# Patient Record
Sex: FEMALE | Race: WHITE | Employment: FULL TIME | URBAN - METROPOLITAN AREA
[De-identification: names, ages, dates, MRNs, and addresses within clinical notes are randomized per-mention and may not be internally consistent; named-entity substitution may affect disease eponyms.]

---

## 2017-01-31 ENCOUNTER — OFFICE VISIT (OUTPATIENT)
Dept: INTERNAL MEDICINE CLINIC | Age: 27
End: 2017-01-31

## 2017-01-31 VITALS
HEART RATE: 82 BPM | WEIGHT: 156 LBS | SYSTOLIC BLOOD PRESSURE: 126 MMHG | BODY MASS INDEX: 26.63 KG/M2 | HEIGHT: 64 IN | DIASTOLIC BLOOD PRESSURE: 89 MMHG | RESPIRATION RATE: 14 BRPM

## 2017-01-31 DIAGNOSIS — R10.13 EPIGASTRIC PAIN: Primary | ICD-10-CM

## 2017-01-31 RX ORDER — OMEPRAZOLE 40 MG/1
40 CAPSULE, DELAYED RELEASE ORAL DAILY
Qty: 30 CAP | Refills: 0 | Status: SHIPPED | OUTPATIENT
Start: 2017-01-31 | End: 2020-09-09

## 2017-02-01 LAB
ALBUMIN SERPL-MCNC: 4.4 G/DL (ref 3.5–5.5)
ALBUMIN/GLOB SERPL: 1.5 {RATIO} (ref 1.1–2.5)
ALP SERPL-CCNC: 39 IU/L (ref 39–117)
ALT SERPL-CCNC: 12 IU/L (ref 0–32)
AMYLASE SERPL-CCNC: 41 U/L (ref 31–124)
AST SERPL-CCNC: 15 IU/L (ref 0–40)
BILIRUB SERPL-MCNC: 0.4 MG/DL (ref 0–1.2)
BUN SERPL-MCNC: 9 MG/DL (ref 6–20)
BUN/CREAT SERPL: 10 (ref 8–20)
CALCIUM SERPL-MCNC: 9.5 MG/DL (ref 8.7–10.2)
CHLORIDE SERPL-SCNC: 99 MMOL/L (ref 96–106)
CO2 SERPL-SCNC: 22 MMOL/L (ref 18–29)
CREAT SERPL-MCNC: 0.89 MG/DL (ref 0.57–1)
ERYTHROCYTE [DISTWIDTH] IN BLOOD BY AUTOMATED COUNT: 13.3 % (ref 12.3–15.4)
GLOBULIN SER CALC-MCNC: 2.9 G/DL (ref 1.5–4.5)
GLUCOSE SERPL-MCNC: 90 MG/DL (ref 65–99)
H PYLORI IGG SER IA-ACNC: <0.9 U/ML (ref 0–0.8)
HCT VFR BLD AUTO: 42.1 % (ref 34–46.6)
HGB BLD-MCNC: 14.2 G/DL (ref 11.1–15.9)
LIPASE SERPL-CCNC: 26 U/L (ref 0–59)
MCH RBC QN AUTO: 28.7 PG (ref 26.6–33)
MCHC RBC AUTO-ENTMCNC: 33.7 G/DL (ref 31.5–35.7)
MCV RBC AUTO: 85 FL (ref 79–97)
PLATELET # BLD AUTO: 238 X10E3/UL (ref 150–379)
POTASSIUM SERPL-SCNC: 4.7 MMOL/L (ref 3.5–5.2)
PROT SERPL-MCNC: 7.3 G/DL (ref 6–8.5)
RBC # BLD AUTO: 4.95 X10E6/UL (ref 3.77–5.28)
SODIUM SERPL-SCNC: 138 MMOL/L (ref 134–144)
WBC # BLD AUTO: 6.1 X10E3/UL (ref 3.4–10.8)

## 2017-02-22 ENCOUNTER — OFFICE VISIT (OUTPATIENT)
Dept: INTERNAL MEDICINE CLINIC | Age: 27
End: 2017-02-22

## 2017-02-22 VITALS
HEART RATE: 103 BPM | SYSTOLIC BLOOD PRESSURE: 126 MMHG | RESPIRATION RATE: 14 BRPM | HEIGHT: 64 IN | BODY MASS INDEX: 26.8 KG/M2 | TEMPERATURE: 98.2 F | DIASTOLIC BLOOD PRESSURE: 84 MMHG | WEIGHT: 157 LBS

## 2017-02-22 DIAGNOSIS — R10.10 PAIN OF UPPER ABDOMEN: Primary | ICD-10-CM

## 2017-02-22 NOTE — PROGRESS NOTES
Chief Complaint   Patient presents with    Epigastric Pain     follow-up     she is a 24y.o. year old female who presents for follow-up of epigastric pain. Pt has not had diary in 2 weeks and her symptoms are no better. She is taking the prilosec everyday. Pt states she feels more nauseated now. She has tried gluten free in the past but no resolution. Reviewed and agree with Nurse Note and duplicated in this note. Reviewed PmHx, RxHx, FmHx, SocHx, AllgHx and updated and dated in the chart.     Family History   Problem Relation Age of Onset    No Known Problems Mother     No Known Problems Father        Past Medical History:   Diagnosis Date    Chlamydia     HX OTHER MEDICAL     Gardasil, 3/3 injections    Pap smear for cervical cancer screening 04/23/2013    negative      Social History     Social History    Marital status: SINGLE     Spouse name: N/A    Number of children: N/A    Years of education: N/A     Social History Main Topics    Smoking status: Never Smoker    Smokeless tobacco: Never Used    Alcohol use No    Drug use: No    Sexual activity: Yes     Partners: Male     Birth control/ protection: Pill     Other Topics Concern    Not on file     Social History Narrative    No narrative on file        Review of Systems - negative except as listed above      Objective:     Vitals:    02/22/17 0916   BP: 126/84   Pulse: (!) 103   Resp: 14   Temp: 98.2 °F (36.8 °C)   Weight: 157 lb (71.2 kg)   Height: 5' 4\" (1.626 m)       Physical Examination: General appearance - alert, well appearing, and in no distress  Eyes - pupils equal and reactive, extraocular eye movements intact  Ears - bilateral TM's and external ear canals normal  Nose - normal and patent, no erythema, discharge or polyps  Mouth - mucous membranes moist, pharynx normal without lesions  Neck - supple, no significant adenopathy  Chest - clear to auscultation, no wheezes, rales or rhonchi, symmetric air entry  Heart - normal rate, regular rhythm, normal S1, S2, no murmurs, rubs, clicks or gallops  Abdomen - soft, nontender, nondistended, no masses or organomegaly  Extremities - peripheral pulses normal, no pedal edema, no clubbing or cyanosis  Skin - normal coloration and turgor, no rashes, no suspicious skin lesions noted    Assessment/ Plan: Angel Valle was seen today for epigastric pain. Diagnoses and all orders for this visit:    Pain of upper abdomen  -     REFERRAL TO GASTROENTEROLOGY   take prilosec OTC until she see's GI    Follow-up Disposition:  Return if symptoms worsen or fail to improve. I have discussed the diagnosis with the patient and the intended plan as seen in the above orders. The patient has received an after-visit summary and questions were answered concerning future plans. Medication Side Effects and Warnings were discussed with patient: yes  Patient Labs were reviewed and or requested: yes  Patient Past Records were reviewed and or requested  yes  I have discussed the diagnosis with the patient and the intended plan as seen in the above orders. The patient has received an after-visit summary and questions were answered concerning future plans. Pt agrees to call or return to clinic and/or go to closest ER with any worsening of symptoms. This may include, but not limited to increased fever (>100.4) with NSAIDS or Tylenol, increased edema, confusion, rash, worsening of presenting symptoms. 1) Remember to stay active and/or exercise regularly (I suggest 30-45 minutes daily)   2) For reliable dietary information, go to www. EATRIGHT.org. You may wish to consider seeing the nutritionist at [facility] at #[phone] or [phone], also consider the [address] [facility].   3) I routinely suggest a complete physical exam once each year (your birth month)

## 2017-02-22 NOTE — MR AVS SNAPSHOT
Visit Information Date & Time Provider Department Dept. Phone Encounter #  
 2/22/2017  9:30 AM [address], MD Romayne [facility] [place] [facility] and [place] [address] [id number] Follow-up Instructions Return if symptoms worsen or fail to improve. Your Appointments 5/17/2017 10:30 AM  
ESTABLISHED PATIENT with MD Gaudencio Lizama ([address]) Appt Note: ae   TP  
 [address] [facility] [id number]  
[facility] [address] Upcoming Health Maintenance Date Due  
 HPV AGE 9Y-34Y (1 of 3 - Female 3 Dose Series) 1/14/2003 DTaP/Tdap/Td series (1 - Tdap) 1/14/2013 INFLUENZA AGE 9 TO ADULT 8/1/2016 PAP AKA CERVICAL CYTOLOGY 5/16/2019 Allergies as of 2/22/2017  Review Complete On: 2/22/2017 By: Marjorie Eldridge LPN No Known Allergies Current Immunizations  Never Reviewed No immunizations on file. Not reviewed this visit You Were Diagnosed With   
  
 Codes Comments Pain of upper abdomen    -  Primary ICD-10-CM: R10.10 ICD-9-CM: 789.09 Vitals BP  
  
  
  
  
  
 126/84 BMI and BSA Data Body Mass Index Body Surface Area  
 26.95 kg/m 2 1.79 m 2 Preferred Pharmacy Pharmacy Name Phone [id number] [phone] Your Updated Medication List  
  
   
This list is accurate as of: 2/22/17  9:34 AM.  Always use your most recent med list.  
  
  
  
  
 desogestrel-ethinyl estradiol 0.15-0.03 mg Tab Commonly known as:  APRI Take 1 Tab by mouth daily. omeprazole 40 mg capsule Commonly known as:  PRILOSEC Take 1 Cap by mouth daily. We Performed the Following REFERRAL TO GASTROENTEROLOGY [OEV75 Custom] Follow-up Instructions Return if symptoms worsen or fail to improve. Referral Information Referral ID Referred By Referred To  
  
 [id number] Shiv CERVANTES 36., MD   
   [address] [facility] [address] [facility], [address] Phone: [phone] Fax: [phone] Visits Status Start Date End Date 1 New Request 2/22/17 2/22/18 If your referral has a status of pending review or denied, additional information will be sent to support the outcome of this decision. Introducing [facility]! Breana Fish introduces [employer] patient portal. Now you can access parts of your medical record, email your doctor's office, and request medication refills online. 1. In your internet browser, go to [email or web address] 2. Click on the First Time User? Click Here link in the Sign In box. You will see the New Member Sign Up page. 3. Enter your [employer] Access Code exactly as it appears below. You will not need to use this code after youve completed the sign-up process. If you do not sign up before the expiration date, you must request a new code. · [employer] Access Code: [email or web address] Expires: 5/1/2017 11:20 AM 
 
4. Enter the last four digits of your Social Security Number (xxxx) and Date of Birth (mm/dd/yyyy) as indicated and click Submit. You will be taken to the next sign-up page. 5. Create a [employer] ID. This will be your [employer] login ID and cannot be changed, so think of one that is secure and easy to remember. 6. Create a [employer] password. You can change your password at any time. 7. Enter your Password Reset Question and Answer. This can be used at a later time if you forget your password. 8. Enter your e-mail address. You will receive e-mail notification when new information is available in [address] 9. Click Sign Up. You can now view and download portions of your medical record. 10. Click the Download Summary menu link to download a portable copy of your medical information. If you have questions, please visit the Frequently Asked Questions section of the [employer] website. Remember, [employer] is NOT to be used for urgent needs. For medical emergencies, dial 911. Now available from your iPhone and Android! Please provide this summary of care documentation to your next provider. If you have any questions after today's visit, please call [phone].

## 2017-02-22 NOTE — PATIENT INSTRUCTIONS

## 2017-03-27 ENCOUNTER — ANESTHESIA EVENT (OUTPATIENT)
Dept: ENDOSCOPY | Age: 27
End: 2017-03-27
Payer: COMMERCIAL

## 2017-03-27 ENCOUNTER — HOSPITAL ENCOUNTER (OUTPATIENT)
Age: 27
Setting detail: OUTPATIENT SURGERY
Discharge: HOME | End: 2017-03-27
Attending: INTERNAL MEDICINE | Admitting: INTERNAL MEDICINE
Payer: COMMERCIAL

## 2017-03-27 ENCOUNTER — SURGERY (OUTPATIENT)
Age: 27
End: 2017-03-27

## 2017-03-27 ENCOUNTER — ANESTHESIA (OUTPATIENT)
Dept: ENDOSCOPY | Age: 27
End: 2017-03-27
Payer: COMMERCIAL

## 2017-03-27 VITALS
OXYGEN SATURATION: 100 % | TEMPERATURE: 97.8 F | RESPIRATION RATE: 27 BRPM | DIASTOLIC BLOOD PRESSURE: 68 MMHG | SYSTOLIC BLOOD PRESSURE: 119 MMHG | HEART RATE: 77 BPM

## 2017-03-27 LAB — HCG UR QL: NEGATIVE

## 2017-03-27 PROCEDURE — 88305 TISSUE EXAM BY PATHOLOGIST: CPT | Performed by: INTERNAL MEDICINE

## 2017-03-27 PROCEDURE — 77030009426 HC FCPS BIOP ENDOSC BSC -B: Performed by: INTERNAL MEDICINE

## 2017-03-27 PROCEDURE — 76040000019: Performed by: INTERNAL MEDICINE

## 2017-03-27 PROCEDURE — 81025 URINE PREGNANCY TEST: CPT

## 2017-03-27 PROCEDURE — 74011000250 HC RX REV CODE- 250

## 2017-03-27 PROCEDURE — 74011250636 HC RX REV CODE- 250/636: Performed by: INTERNAL MEDICINE

## 2017-03-27 PROCEDURE — 76060000031 HC ANESTHESIA FIRST 0.5 HR: Performed by: INTERNAL MEDICINE

## 2017-03-27 PROCEDURE — 74011250636 HC RX REV CODE- 250/636

## 2017-03-27 RX ORDER — DEXTROMETHORPHAN/PSEUDOEPHED 2.5-7.5/.8
1.2 DROPS ORAL
Status: DISCONTINUED | OUTPATIENT
Start: 2017-03-27 | End: 2017-03-27 | Stop reason: HOSPADM

## 2017-03-27 RX ORDER — SODIUM CHLORIDE 0.9 % (FLUSH) 0.9 %
5-10 SYRINGE (ML) INJECTION EVERY 8 HOURS
Status: DISCONTINUED | OUTPATIENT
Start: 2017-03-27 | End: 2017-03-27 | Stop reason: HOSPADM

## 2017-03-27 RX ORDER — EPINEPHRINE 0.1 MG/ML
1 INJECTION INTRACARDIAC; INTRAVENOUS
Status: DISCONTINUED | OUTPATIENT
Start: 2017-03-27 | End: 2017-03-27 | Stop reason: HOSPADM

## 2017-03-27 RX ORDER — PROPOFOL 10 MG/ML
INJECTION, EMULSION INTRAVENOUS AS NEEDED
Status: DISCONTINUED | OUTPATIENT
Start: 2017-03-27 | End: 2017-03-27 | Stop reason: HOSPADM

## 2017-03-27 RX ORDER — ATROPINE SULFATE 0.1 MG/ML
0.5 INJECTION INTRAVENOUS
Status: DISCONTINUED | OUTPATIENT
Start: 2017-03-27 | End: 2017-03-27 | Stop reason: HOSPADM

## 2017-03-27 RX ORDER — FLUMAZENIL 0.1 MG/ML
0.2 INJECTION INTRAVENOUS
Status: DISCONTINUED | OUTPATIENT
Start: 2017-03-27 | End: 2017-03-27 | Stop reason: HOSPADM

## 2017-03-27 RX ORDER — SODIUM CHLORIDE 0.9 % (FLUSH) 0.9 %
5-10 SYRINGE (ML) INJECTION AS NEEDED
Status: DISCONTINUED | OUTPATIENT
Start: 2017-03-27 | End: 2017-03-27 | Stop reason: HOSPADM

## 2017-03-27 RX ORDER — LIDOCAINE HYDROCHLORIDE 20 MG/ML
INJECTION, SOLUTION EPIDURAL; INFILTRATION; INTRACAUDAL; PERINEURAL AS NEEDED
Status: DISCONTINUED | OUTPATIENT
Start: 2017-03-27 | End: 2017-03-27 | Stop reason: HOSPADM

## 2017-03-27 RX ORDER — NALOXONE HYDROCHLORIDE 0.4 MG/ML
0.4 INJECTION, SOLUTION INTRAMUSCULAR; INTRAVENOUS; SUBCUTANEOUS
Status: DISCONTINUED | OUTPATIENT
Start: 2017-03-27 | End: 2017-03-27 | Stop reason: HOSPADM

## 2017-03-27 RX ORDER — SODIUM CHLORIDE 9 MG/ML
50 INJECTION, SOLUTION INTRAVENOUS CONTINUOUS
Status: DISCONTINUED | OUTPATIENT
Start: 2017-03-27 | End: 2017-03-27 | Stop reason: HOSPADM

## 2017-03-27 RX ORDER — FENTANYL CITRATE 50 UG/ML
100 INJECTION, SOLUTION INTRAMUSCULAR; INTRAVENOUS
Status: DISCONTINUED | OUTPATIENT
Start: 2017-03-27 | End: 2017-03-27 | Stop reason: HOSPADM

## 2017-03-27 RX ORDER — MIDAZOLAM HYDROCHLORIDE 1 MG/ML
.25-1 INJECTION, SOLUTION INTRAMUSCULAR; INTRAVENOUS
Status: DISCONTINUED | OUTPATIENT
Start: 2017-03-27 | End: 2017-03-27 | Stop reason: HOSPADM

## 2017-03-27 RX ADMIN — SODIUM CHLORIDE: 900 INJECTION, SOLUTION INTRAVENOUS at 14:28

## 2017-03-27 RX ADMIN — PROPOFOL 50 MG: 10 INJECTION, EMULSION INTRAVENOUS at 14:42

## 2017-03-27 RX ADMIN — PROPOFOL 50 MG: 10 INJECTION, EMULSION INTRAVENOUS at 14:40

## 2017-03-27 RX ADMIN — PROPOFOL 50 MG: 10 INJECTION, EMULSION INTRAVENOUS at 14:44

## 2017-03-27 RX ADMIN — PROPOFOL 70 MG: 10 INJECTION, EMULSION INTRAVENOUS at 14:45

## 2017-03-27 RX ADMIN — PROPOFOL 30 MG: 10 INJECTION, EMULSION INTRAVENOUS at 14:48

## 2017-03-27 RX ADMIN — PROPOFOL 50 MG: 10 INJECTION, EMULSION INTRAVENOUS at 14:43

## 2017-03-27 RX ADMIN — LIDOCAINE HYDROCHLORIDE 20 MG: 20 INJECTION, SOLUTION EPIDURAL; INFILTRATION; INTRACAUDAL; PERINEURAL at 14:40

## 2017-03-27 RX ADMIN — PROPOFOL 50 MG: 10 INJECTION, EMULSION INTRAVENOUS at 14:41

## 2017-03-27 NOTE — ANESTHESIA PREPROCEDURE EVALUATION
Anesthetic History     PONV          Review of Systems / Medical History  Patient summary reviewed, nursing notes reviewed and pertinent labs reviewed    Pulmonary  Within defined limits                 Neuro/Psych   Within defined limits           Cardiovascular  Within defined limits                     GI/Hepatic/Renal  Within defined limits              Endo/Other  Within defined limits           Other Findings              Physical Exam    Airway  Mallampati: II  TM Distance: > 6 cm  Neck ROM: normal range of motion   Mouth opening: Normal     Cardiovascular  Regular rate and rhythm,  S1 and S2 normal,  no murmur, click, rub, or gallop             Dental    Dentition: Upper dentition intact and Lower dentition intact     Pulmonary  Breath sounds clear to auscultation               Abdominal  GI exam deferred       Other Findings            Anesthetic Plan    ASA: 1  Anesthesia type: MAC            Anesthetic plan and risks discussed with: Patient

## 2017-03-27 NOTE — IP AVS SNAPSHOT
[address] [facility] [address] [facility] 
[phone] Patient: [address] [facility] 2050 MRN: [id number] USS:[id number] You are allergic to the following No active allergies Recent Documentation OB Status Smoking Status Having regular periods Never Smoker Emergency Contacts Name Discharge Info Relation Home Work Mobile Flavia Christie  Parent [1] [phone] Benedict Christie  Parent [1]   [phone] About your hospitalization You were admitted on:  March 27, 2017 You last received care in the:  [facility] ENDOSCOPY You were discharged on:  March 27, 2017 Unit phone number:  [phone] Why you were hospitalized Your primary diagnosis was:  Not on File Providers Seen During Your Hospitalizations Provider Role Specialty Primary office phone Kyle Bae MD Attending Provider Gastroenterology [phone] Your Primary Care Physician (PCP) Primary Care Physician Office Phone Office Fax Saima Márquez [phone] Follow-up Information None Current Discharge Medication List  
  
CONTINUE these medications which have NOT CHANGED Dose & Instructions Dispensing Information Comments Morning Noon Evening Bedtime CALCIUM + D PO Your last dose was: Your next dose is: Take  by mouth. Chews one gummy po once daily. Refills:  0  
     
   
   
   
  
 desogestrel-ethinyl estradiol 0.15-0.03 mg Tab Commonly known as:  APRI Your last dose was: Your next dose is:    
   
   
 Dose:  1 Tab Take 1 Tab by mouth daily. Quantity:  84 Tab Refills:  4  
     
   
   
   
  
 omeprazole 40 mg capsule Commonly known as:  PRILOSEC Your last dose was: Your next dose is:    
   
   
 Dose:  40 mg Take 1 Cap by mouth daily. Quantity:  30 Cap Refills:  0 Discharge Instructions Krysten [address] 
[address] [facility] [address] [place], [address] 
[phone] DISCHARGE INSTRUCTIONS [address] 2050 [id number] 
1990 DISCOMFORT: 
Sore throat- throat lozenges or warm salt water gargle 
redness at IV site- apply warm compress to area; if redness or soreness persist- contact your physician Gaseous discomfort- walking, belching will help relieve any discomfort You may not operate a vehicle for 12 hours You may not engage in an occupation involving machinery or appliances for rest of today You may not drink alcoholic beverages for at least 12 hours Avoid making any critical decisions for at least 24 hour DIET You may eat and drink after you leave. You may resume your regular diet  however -  remember your colon is empty and a heavy meal will produce gas. Avoid these foods:  vegetables, fried / greasy foods, carbonated drinks ACTIVITY You may resume your normal daily activities Spend the remainder of the day resting -  avoid any strenuous activity. CALL M.D. ANY SIGN OF Increasing pain, nausea, vomiting Abdominal distension (swelling) New increased bleeding (oral or rectal) Fever (chills) Pain in chest area Bloody discharge from nose or mouth Shortness of breath Follow-up Instructions: 
 Call Dr. Alex Briggs for any questions or problems. If we took a biopsy please call the office within 2 weeks to discuss your                             pathology results. Telephone # [phone] Continue same medications. Discharge Orders None Introducing [facility]! Rupinder Aviles introduces [employer] patient portal. Now you can access parts of your medical record, email your doctor's office, and request medication refills online. 1. In your internet browser, go to [email or web address] 2. Click on the First Time User? Click Here link in the Sign In box. You will see the New Member Sign Up page. 3. Enter your [employer] Access Code exactly as it appears below. You will not need to use this code after youve completed the sign-up process. If you do not sign up before the expiration date, you must request a new code. · [employer] Access Code: [email or web address] Expires: 5/1/2017 12:20 PM 
 
4. Enter the last four digits of your Social Security Number (xxxx) and Date of Birth (mm/dd/yyyy) as indicated and click Submit. You will be taken to the next sign-up page. 5. Create a [employer] ID. This will be your [employer] login ID and cannot be changed, so think of one that is secure and easy to remember. 6. Create a [employer] password. You can change your password at any time. 7. Enter your Password Reset Question and Answer. This can be used at a later time if you forget your password. 8. Enter your e-mail address. You will receive e-mail notification when new information is available in [address] 9. Click Sign Up. You can now view and download portions of your medical record. 10. Click the Download Summary menu link to download a portable copy of your medical information. If you have questions, please visit the Frequently Asked Questions section of the [employer] website. Remember, [employer] is NOT to be used for urgent needs. For medical emergencies, dial 911. Now available from your iPhone and Android! General Information Please provide this summary of care documentation to your next provider. Patient Signature:  ____________________________________________________________ Date:  ____________________________________________________________  
  
Servando Bateman Provider Signature:  ____________________________________________________________ Date:  ____________________________________________________________

## 2017-03-27 NOTE — DISCHARGE INSTRUCTIONS
[address] [facility].  [address]  [id number]  1990    DISCOMFORT:  Sore throat- throat lozenges or warm salt water gargle  redness at IV site- apply warm compress to area; if redness or soreness persist- contact your physician  Gaseous discomfort- walking, belching will help relieve any discomfort  You may not operate a vehicle for 12 hours  You may not engage in an occupation involving machinery or appliances for rest of today  You may not drink alcoholic beverages for at least 12 hours  Avoid making any critical decisions for at least 24 hour  DIET  You may eat and drink after you leave. You may resume your regular diet - however -  remember your colon is empty and a heavy meal will produce gas. Avoid these foods:  vegetables, fried / greasy foods, carbonated drinks    ACTIVITY  You may resume your normal daily activities   Spend the remainder of the day resting -  avoid any strenuous activity. CALL M.D. ANY SIGN OF   Increasing pain, nausea, vomiting  Abdominal distension (swelling)  New increased bleeding (oral or rectal)  Fever (chills)  Pain in chest area  Bloody discharge from nose or mouth  Shortness of breath    Follow-up Instructions:   Call Dr. Linda Saleem for any questions or problems. If we took a biopsy please call the office within 2 weeks to discuss your                             pathology results. Telephone # [phone]        Continue same medications.

## 2017-03-27 NOTE — H&P
[address]  [address] [facility], [address]  [phone]                                History and Physical     NAME: Red Umanzor   :  1990   MRN:  [id number]     HPI:  The patient was seen and examined.     Past Surgical History:   Procedure Laterality Date    HX HEENT      wisdom teeth removed    HX TONSILLECTOMY       Past Medical History:   Diagnosis Date    Chlamydia     HX OTHER MEDICAL     Gardasil, 3/3 injections    Nausea & vomiting     Pap smear for cervical cancer screening 2013    negative     Social History   Substance Use Topics    Smoking status: Never Smoker    Smokeless tobacco: Never Used    Alcohol use No     No Known Allergies  Family History   Problem Relation Age of Onset    No Known Problems Mother     No Known Problems Father      Current Facility-Administered Medications   Medication Dose Route Frequency    0.9% sodium chloride infusion  50 mL/hr IntraVENous CONTINUOUS    sodium chloride (NS) flush 5-10 mL  5-10 mL IntraVENous Q8H    sodium chloride (NS) flush 5-10 mL  5-10 mL IntraVENous PRN    midazolam (VERSED) injection 0.25-10 mg  0.25-10 mg IntraVENous Multiple    fentaNYL citrate (PF) injection 100 mcg  100 mcg IntraVENous MULTIPLE DOSE GIVEN    naloxone (NARCAN) injection 0.4 mg  0.4 mg IntraVENous Multiple    flumazenil (ROMAZICON) 0.1 mg/mL injection 0.2 mg  0.2 mg IntraVENous Multiple    simethicone (MYLICON) 76GU/8.2ZK oral drops 80 mg  1.2 mL Oral Multiple    atropine injection 0.5 mg  0.5 mg IntraVENous ONCE PRN    EPINEPHrine (ADRENALIN) 0.1 mg/mL syringe 1 mg  1 mg Endoscopically ONCE PRN    0.9% sodium chloride infusion  50 mL/hr IntraVENous CONTINUOUS    sodium chloride (NS) flush 5-10 mL  5-10 mL IntraVENous Q8H    sodium chloride (NS) flush 5-10 mL  5-10 mL IntraVENous PRN    midazolam (VERSED) injection 0.25-10 mg  0.25-10 mg IntraVENous Multiple    fentaNYL citrate (PF) injection 100 mcg  100 mcg IntraVENous MULTIPLE DOSE GIVEN    naloxone (NARCAN) injection 0.4 mg  0.4 mg IntraVENous Multiple    flumazenil (ROMAZICON) 0.1 mg/mL injection 0.2 mg  0.2 mg IntraVENous Multiple    simethicone (MYLICON) 92EL/9.9NZ oral drops 80 mg  1.2 mL Oral Multiple    atropine injection 0.5 mg  0.5 mg IntraVENous ONCE PRN    EPINEPHrine (ADRENALIN) 0.1 mg/mL syringe 1 mg  1 mg Endoscopically ONCE PRN     Facility-Administered Medications Ordered in Other Encounters   Medication Dose Route Frequency    lidocaine (PF) (XYLOCAINE) 20 mg/mL (2 %) injection   IntraVENous PRN         PHYSICAL EXAM:  General: WD, WN. Alert, cooperative, no acute distress    HEENT: NC, Atraumatic. PERRLA, EOMI. Anicteric sclerae. Lungs:  CTA Bilaterally. No Wheezing/Rhonchi/Rales. Heart:  Regular  rhythm,  No murmur, No Rubs, No Gallops  Abdomen: Soft, Non distended, Non tender.  +Bowel sounds, no HSM  Extremities: No c/c/e  Neurologic:  CN 2-12 gi, Alert and oriented X 3. No acute neurological distress   Psych:   Good insight. Not anxious nor agitated. The heart, lungs and mental status were satisfactory for the administration of MAC sedation and for the procedure.       Mallampati score: 3       Assessment:   · Epigastric pain    Plan:   · Endoscopic procedure  · MAC sedation   ·

## 2017-03-27 NOTE — PROCEDURES
[address] [facility].  217 [facility] 140 Vamsi [place], [address]  [phone]                            NAME:  Kianna Silva   :   1990   MRN:   [id number]     Date/Time:  3/27/2017 2:51 PM    Esophagogastroduodenoscopy (EGD) Procedure Note    :  Gwendolyn Valerio MD    Referring Provider:  Yenifer Mejia MD    Anethesia/Sedation:  MAC anesthesia    Procedure Details     After infom consent was obtained for the procedure, with all risks and benefits of procedure explained the patient was taken to the endoscopy suite and placed in the left lateral decubitus position. Following sequential administration of sedation as per above, the [id number] gastroscope was inserted into the mouth and advanced under direct vision to second portion of the duodenum. A careful inspection was made as the gastroscope was withdrawn, including a retroflexed view of the proximal stomach; findings and interventions are described below. Findings:  Esophagus: Linear furrows suspicious for eosinophilic esophagitis were seen in lower third of esophagus. Z line was irregular  Stomach:normal   Duodenum/jejunum:normal      Therapies:  biopsy of lower third of esophagus    Specimens: biopsy of lower third of esophagus           EBL: None    Complications:   None; patient tolerated the procedure well. Impression:    See Postoperative diagnosis above    Recommendations:  -Continue acid suppression. , -Await pathology. , -Follow symptoms. , -Follow up with me., -No NSAID's  -Gastric emptying scan if symptoms persist    Discharge disposition:  Home in the company of  when able to ambulate    Gwendolyn Valerio MD

## 2017-03-27 NOTE — ANESTHESIA POSTPROCEDURE EVALUATION
Post-Anesthesia Evaluation and Assessment    Patient: Liliana Powers MRN: [id number]  SSN: [id number]    YOB: 1990  Age: 24 y.o. Sex: female       Cardiovascular Function/Vital Signs  Visit Vitals    /68    Pulse 77    Temp 36.6 °C (97.8 °F)    Resp 27    SpO2 100%       Patient is status post MAC anesthesia for Procedure(s):  ESOPHAGOGASTRODUODENOSCOPY (EGD)  ESOPHAGOGASTRODUODENAL (EGD) BIOPSY. Nausea/Vomiting: None    Postoperative hydration reviewed and adequate. Pain:  Pain Scale 1: Numeric (0 - 10) (03/27/17 1512)  Pain Intensity 1: 0 (03/27/17 1512)   Managed    Neurological Status: At baseline    Mental Status and Level of Consciousness: Arousable    Pulmonary Status:   O2 Device: Room air (03/27/17 1512)   Adequate oxygenation and airway patent    Complications related to anesthesia: None    Post-anesthesia assessment completed.  No concerns    Signed By: Thony Valdivia MD     March 27, 2017

## 2017-03-27 NOTE — PROGRESS NOTES
Thuy Talbot  1990  [id number]    Situation:  Verbal report received from: linn koenig  Procedure: Procedure(s):  ESOPHAGOGASTRODUODENOSCOPY (EGD)  ESOPHAGOGASTRODUODENAL (EGD) BIOPSY    Background:    Preoperative diagnosis: EPIGASTRIC PAIN  Postoperative diagnosis: 1. abnormal esophageal mucosa    :  Dr. Francois López  Assistant(s): Endoscopy Technician-1: Choco Dc  Endoscopy RN-1: Yoli Cote RN    Specimens:   ID Type Source Tests Collected by Time Destination   1 : abnormal esophageal mucosa biopspy Preservative   Kyle Bae MD 3/27/2017 1447 Pathology     H. Pylori  no    Assessment:  Intra-procedure medications     Anesthesia gave intra-procedure sedation and medications, see anesthesia flow sheet yes    Intravenous fluids: NS@ KVO     Vital signs stable yes    Abdominal assessment: round and soft yes    Recommendation:  Discharge patient per MD order yes.   Return to floor n/a  Family or Friend yes  Permission to share finding with family or friend yes

## 2017-05-31 ENCOUNTER — OFFICE VISIT (OUTPATIENT)
Dept: OBGYN CLINIC | Age: 27
End: 2017-05-31

## 2017-05-31 ENCOUNTER — TELEPHONE (OUTPATIENT)
Dept: OBGYN CLINIC | Age: 27
End: 2017-05-31

## 2017-05-31 VITALS
WEIGHT: 160.4 LBS | DIASTOLIC BLOOD PRESSURE: 64 MMHG | BODY MASS INDEX: 27.39 KG/M2 | RESPIRATION RATE: 18 BRPM | HEIGHT: 64 IN | SYSTOLIC BLOOD PRESSURE: 112 MMHG

## 2017-05-31 DIAGNOSIS — N89.8 VAGINAL ITCHING: ICD-10-CM

## 2017-05-31 DIAGNOSIS — Z11.3 SCREENING EXAMINATION FOR VENEREAL DISEASE: ICD-10-CM

## 2017-05-31 DIAGNOSIS — B37.31 YEAST VAGINITIS: ICD-10-CM

## 2017-05-31 DIAGNOSIS — Z01.419 ROUTINE GYNECOLOGICAL EXAMINATION: Primary | ICD-10-CM

## 2017-05-31 DIAGNOSIS — N94.9 VAGINAL BURNING: ICD-10-CM

## 2017-05-31 DIAGNOSIS — N89.8 VAGINAL DISCHARGE: ICD-10-CM

## 2017-05-31 LAB — WET MOUNT POCT, WMPOCT: NORMAL

## 2017-05-31 RX ORDER — DESOGESTREL AND ETHINYL ESTRADIOL 0.15-0.03
1 KIT ORAL DAILY
Qty: 3 PACKAGE | Refills: 4 | Status: SHIPPED | OUTPATIENT
Start: 2017-05-31 | End: 2018-05-03 | Stop reason: SDUPTHER

## 2017-05-31 RX ORDER — NYSTATIN AND TRIAMCINOLONE ACETONIDE 100000; 1 [USP'U]/G; MG/G
OINTMENT TOPICAL 2 TIMES DAILY
Qty: 30 G | Refills: 0 | Status: SHIPPED | OUTPATIENT
Start: 2017-05-31 | End: 2017-06-07

## 2017-05-31 RX ORDER — FLUCONAZOLE 150 MG/1
150 TABLET ORAL DAILY
Qty: 1 TAB | Refills: 0 | Status: SHIPPED | OUTPATIENT
Start: 2017-05-31 | End: 2019-06-28 | Stop reason: ALTCHOICE

## 2017-05-31 NOTE — PATIENT INSTRUCTIONS
Well Visit, Ages 25 to 48: Care Instructions  Your Care Instructions  Physical exams can help you stay healthy. Your doctor has checked your overall health and may have suggested ways to take good care of yourself. He or she also may have recommended tests. At home, you can help prevent illness with healthy eating, regular exercise, and other steps. Follow-up care is a key part of your treatment and safety. Be sure to make and go to all appointments, and call your doctor if you are having problems. It's also a good idea to know your test results and keep a list of the medicines you take. How can you care for yourself at home? · Reach and stay at a healthy weight. This will lower your risk for many problems, such as obesity, diabetes, heart disease, and high blood pressure. · Get at least 30 minutes of physical activity on most days of the week. Walking is a good choice. You also may want to do other activities, such as running, swimming, cycling, or playing tennis or team sports. Discuss any changes in your exercise program with your doctor. · Do not smoke or allow others to smoke around you. If you need help quitting, talk to your doctor about stop-smoking programs and medicines. These can increase your chances of quitting for good. · Talk to your doctor about whether you have any risk factors for sexually transmitted infections (STIs). Having one sex partner (who does not have STIs and does not have sex with anyone else) is a good way to avoid these infections. · Use birth control if you do not want to have children at this time. Talk with your doctor about the choices available and what might be best for you. · Protect your skin from too much sun. When you're outdoors from 10 a.m. to 4 p.m., stay in the shade or cover up with clothing and a hat with a wide brim. Wear sunglasses that block UV rays. Even when it's cloudy, put broad-spectrum sunscreen (SPF 30 or higher) on any exposed skin.   · See a dentist one or two times a year for checkups and to have your teeth cleaned. · Wear a seat belt in the car. · Drink alcohol in moderation, if at all. That means no more than 2 drinks a day for men and 1 drink a day for women. Follow your doctor's advice about when to have certain tests. These tests can spot problems early. For everyone  · Cholesterol. Have the fat (cholesterol) in your blood tested after age 21. Your doctor will tell you how often to have this done based on your age, family history, or other things that can increase your risk for heart disease. · Blood pressure. Have your blood pressure checked during a routine doctor visit. Your doctor will tell you how often to check your blood pressure based on your age, your blood pressure results, and other factors. · Vision. Talk with your doctor about how often to have a glaucoma test.  · Diabetes. Ask your doctor whether you should have tests for diabetes. · Colon cancer. Have a test for colon cancer at age 48. You may have one of several tests. If you are younger than 48, you may need a test earlier if you have any risk factors. Risk factors include whether you already had a precancerous polyp removed from your colon or whether your parent, brother, sister, or child has had colon cancer. For women  · Breast exam and mammogram. Talk to your doctor about when you should have a clinical breast exam and a mammogram. Medical experts differ on whether and how often women under 50 should have these tests. Your doctor can help you decide what is right for you. · Pap test and pelvic exam. Begin Pap tests at age 24. A Pap test is the best way to find cervical cancer. The test often is part of a pelvic exam. Ask how often to have this test.  · Tests for sexually transmitted infections (STIs). Ask whether you should have tests for STIs. You may be at risk if you have sex with more than one person, especially if your partners do not wear condoms.   For men  · Tests for sexually transmitted infections (STIs). Ask whether you should have tests for STIs. You may be at risk if you have sex with more than one person, especially if you do not wear a condom. · Testicular cancer exam. Ask your doctor whether you should check your testicles regularly. · Prostate exam. Talk to your doctor about whether you should have a blood test (called a PSA test) for prostate cancer. Experts differ on whether and when men should have this test. Some experts suggest it if you are older than 39 and are -American or have a father or brother who got prostate cancer when he was younger than 72. When should you call for help? Watch closely for changes in your health, and be sure to contact your doctor if you have any problems or symptoms that concern you. Where can you learn more? Go to http://jam-jace.info/. Enter P072 in the search box to learn more about \"Well Visit, Ages 25 to 48: Care Instructions. \"  Current as of: July 19, 2016  Content Version: 11.2  © 5720-4915 [employer]. Care instructions adapted under license by [employer] (which disclaims liability or warranty for this information). If you have questions about a medical condition or this instruction, always ask your healthcare professional. [place] [address] any warranty or liability for your use of this information. Vaginal Yeast Infection: Care Instructions  Your Care Instructions  A vaginal yeast infection is caused by too many yeast cells in the vagina. This is common in women of all ages. Itching, vaginal discharge and irritation, and other symptoms can bother you. But yeast infections don't often cause other health problems. Some medicines can increase your risk of getting a yeast infection. These include antibiotics, birth control pills, hormones, and steroids.  You may also be more likely to get a yeast infection if you are pregnant, have diabetes, douche, or wear tight clothes. With treatment, most yeast infections get better in 2 to 3 days. Follow-up care is a key part of your treatment and safety. Be sure to make and go to all appointments, and call your doctor if you are having problems. It's also a good idea to know your test results and keep a list of the medicines you take. How can you care for yourself at home? · Take your medicines exactly as prescribed. Call your doctor if you think you are having a problem with your medicine. · Ask your doctor about over-the-counter (OTC) medicines for yeast infections. They may cost less than prescription medicines. If you use an OTC treatment, read and follow all instructions on the label. · Do not use tampons while using a vaginal cream or suppository. The tampons can absorb the medicine. Use pads instead. · Wear loose cotton clothing. Do not wear nylon or other fabric that holds body heat and moisture close to the skin. · Try sleeping without underwear. · Do not scratch. Relieve itching with a cold pack or a cool bath. · Do not wash your vaginal area more than once a day. Use plain water or a mild, unscented soap. Air-dry the vaginal area. · Change out of wet swimsuits after swimming. · Do not have sex until you have finished your treatment. · Do not douche. When should you call for help? Call your doctor now or seek immediate medical care if:  · You have unexpected vaginal bleeding. · You have new or increased pain in your vagina or pelvis. Watch closely for changes in your health, and be sure to contact your doctor if:  · You have a fever. · You are not getting better after 2 days. · Your symptoms come back after you finish your medicines. Where can you learn more? Go to http://jam-jace.info/. Enter R437 in the search box to learn more about \"Vaginal Yeast Infection: Care Instructions. \"  Current as of: October 13, 2016  Content Version: 11.2  © 4572-6239 [employer]. Care instructions adapted under license by [employer] (which disclaims liability or warranty for this information). If you have questions about a medical condition or this instruction, always ask your healthcare professional. [address] any warranty or liability for your use of this information.

## 2017-05-31 NOTE — TELEPHONE ENCOUNTER
Patient calling stating that she was seen today by TP and given a rx for Mycolog and it is not covered by her insurance and needs something comparable to the Mycolog that would be generic. Please advise.

## 2017-05-31 NOTE — PROGRESS NOTES
[facility]  [email or web address]  [phone]    Herminio Kawasaki, MD, FACOG       Annual Gynecologic Exam:  [facility]  Chief Complaint   Patient presents with    Well Woman    Vaginal Discharge    Vaginal Itching    Other     recurrent yeast infections         Florina San is a 24 y.o.  WHITE OR  female who presents for an annual well woman exam.  Patient's last menstrual period was 2017 (exact date). .    With regard to the Gardisil vaccine, she has received all 3 injections. She does report additional concerns today. Patient reports that about 3 months ago, she noticed this new problem. Has had some yellowish and thick discharge with itching and burning. Expressed that with any kind of contact causes skin irritation. Patient has used Monistat in the past however, patient expressed that it continues to come back. History of Present Illness: The patient is reporting having recurrent yeast infection for 1 year. She reports the symptoms are is unchanged. Aggravating factors include intercourse. And alleviating factors include monistat, temporarily. She reports 6-7 episodes in last year. Using menstrual cup, but no other changes  No change to OCP. Menstrual status:  Her periods are moderate. She does not report dysmenorrhea/painful menses. She does not report irregular bleeding. Sexual history and Contraception:  History   Sexual Activity    Sexual activity: Yes    Partners: Male    Birth control/ protection: Pill     She never use condoms with sexual activity  She does not reports new sexual partner(s) in the last year. The patient does not request STD testing. We recommended testing per CDC guidelines and at patient request.     Preventive Medicine History:  Her last annual GYN exam was about one year ago. Her most recent Pap smear result: normal was obtained in May 2016.   She does not have a history of SHAUN 2, 3 or cervical cancer. Past Medical History:   Diagnosis Date    Chlamydia     HX OTHER MEDICAL     Gardasil, 3/3 injections    Nausea & vomiting     Pap smear for cervical cancer screening 2013    negative     OB History    Para Term  AB SAB TAB Ectopic Multiple Living   0 0 0 0 0 0 0 0 0 0           Past Surgical History:   Procedure Laterality Date    HX ENDOSCOPY  2017    HX HEENT      wisdom teeth removed    HX TONSILLECTOMY       Family History   Problem Relation Age of Onset    No Known Problems Mother     No Known Problems Father      Social History     Social History    Marital status: SINGLE     Spouse name: N/A    Number of children: N/A    Years of education: N/A     Occupational History    Not on file. Social History Main Topics    Smoking status: Never Smoker    Smokeless tobacco: Never Used    Alcohol use No    Drug use: No    Sexual activity: Yes     Partners: Male     Birth control/ protection: Pill     Other Topics Concern    Not on file     Social History Narrative       No Known Allergies    Current Outpatient Prescriptions   Medication Sig    desogestrel-ethinyl estradiol (APRI) 0.15-0.03 mg tab Take 1 Tab by mouth daily.  fluconazole (DIFLUCAN) 150 mg tablet Take 1 Tab by mouth daily.  nystatin-triamcinolone (MYCOLOG) 100,000-0.1 unit/gram-% ointment Apply  to affected area two (2) times a day for 7 days.  CALCIUM CARBONATE/VITAMIN D3 (CALCIUM + D PO) Take  by mouth. Chews one gummy po once daily.  omeprazole (PRILOSEC) 40 mg capsule Take 1 Cap by mouth daily. No current facility-administered medications for this visit.         Patient Active Problem List   Diagnosis Code    Abnormal uterine bleeding N93.9       Review of Systems - History obtained from the patient  Constitutional: negative for weight loss, fever, night sweats  HEENT: negative for hearing loss, earache, congestion, snoring, sorethroat  CV: negative for chest pain, palpitations, edema  Resp: negative for cough, shortness of breath, wheezing  GI: negative for change in bowel habits, abdominal pain, black or bloody stools  : negative for frequency, dysuria, hematuria  GYN: see HPI  MSK: negative for back pain, joint pain, muscle pain  Breast: negative for breast lumps, nipple discharge, galactorrhea  Skin :negative for itching, rash, hives  Neuro: negative for dizziness, headache, confusion, weakness  Psych: negative for anxiety, depression, change in mood  Heme/lymph: negative for bleeding, bruising, pallor    Physical Exam  Visit Vitals    /64 (BP 1 Location: Left arm, BP Patient Position: Sitting)    Resp 18    Ht 5' 4\" (1.626 m)    Wt 160 lb 6.4 oz (72.8 kg)    LMP 05/02/2017 (Exact Date)    BMI 27.53 kg/m2       Constitutional  · Appearance: well-nourished, well developed, alert, in no acute distress    HENT  · Head and Face: appears normal    Neck  · Inspection/Palpation: normal appearance, no masses or tenderness  · Lymph Nodes: no lymphadenopathy present  · Thyroid: gland size normal, nontender, no nodules or masses present on palpation    Chest  · Respiratory Effort: breathing labored  · Auscultation: normal breath sounds    Cardiovascular  · Heart:  · Auscultation: regular rate and rhythm without murmur    Breasts  · Inspection of Breasts: breasts symmetrical, no skin changes, no discharge present, nipple appearance normal, no skin retraction present  · Palpation of Breasts and Axillae: no masses present on palpation, no breast tenderness  · Axillary Lymph Nodes: no lymphadenopathy present    Gastrointestinal  · Abdominal Examination: abdomen non-tender to palpation, normal bowel sounds, no masses present  · Liver and spleen: no hepatomegaly present, spleen not palpable  · Hernias: no hernias identified    Genitourinary  · External Genitalia: normal appearance for age, no discharge present, no tenderness present, external erythema mild of b/l vulva, no masses present  · Vagina: normal vaginal vault without central or paravaginal defects, thin white discharge present, no inflammatory lesions present, no masses present  · Bladder: non-tender to palpation  · Urethra: appears normal  · Cervix: normal   · Uterus: normal size, shape and consistency  · Adnexa: no adnexal tenderness present, no adnexal masses present  · Perineum: perineum within normal limits, no evidence of trauma, no rashes or skin lesions present  · Anus: anus within normal limits, no hemorrhoids present  · Inguinal Lymph Nodes: no lymphadenopathy present    Skin  · General Inspection: no rash, no lesions identified    Neurologic/Psychiatric  · Mental Status:  · Orientation: grossly oriented to person, place and time  · Mood and Affect: mood normal, affect appropriate    Assessment:  24 y.o.  for well woman exam  Encounter Diagnoses   Name Primary?  Vaginal discharge Yes    Vaginal itching     Screening examination for venereal disease     Vaginal burning     Yeast vaginitis      Dx vulvitis[de-identified] new problem to this provider that is moderate, data reviewed: none;  work up planned: wet prep, nuswab, intervention: diflucan/mycolog, consider suppression, good vulvar hygiene      Plan:  The patient was counseled about diet, exercise, healthy lifestyle  We discussed self breast exam  We discussed safer sex practices, condom use and risk factors for sexually transmitted diseases. We discussed current pap smear and HR HPV testing guidelines. We recommend follow up one year for routine annual gynecologic exam or sooner prn  We recommend routine follow up with her primary care doctor for management of chronic medical problems and non-gynecologic concerns  Handouts were given to the patient  We discussed calcium/vitamin D/weight bearing exercise and osteoporosis prevention    We discussed potential causes of vaginal discharge/irritation. We discussed good vulvar hygiene.   Recommended avoid vaginal irritants. Discussed use of mild soaps/detergents. Follow up if NI. We we reviewed wet prep findings with the patient at her visit. Patient aware she will be notified about swab results and prescription sent, if indicated.    Consider suppression for recurrent yeast, await swab  Rec OV if sx recur    Folllow up:  [x] return for annual well woman exam in one year or sooner if she is having problems  [] follow up and ultrasound  [] 6 months  [] 3 months  [] 6 weeks   [] 1 month    Orders Placed This Encounter    NUSWAB VAGINITIS PLUS    AMB POC WET PREP (AKA STAIN, INTERPRET, WET MOUNT)    desogestrel-ethinyl estradiol (APRI) 0.15-0.03 mg tab    fluconazole (DIFLUCAN) 150 mg tablet    nystatin-triamcinolone (MYCOLOG) 100,000-0.1 unit/gram-% ointment       Results for orders placed or performed in visit on 05/31/17   AMB POC SMEAR, STAIN & INTERPRET, WET MOUNT   Result Value Ref Range    Wet mount (POC)      Narrative    ANA    Hypae: negative  Buds: negative    Wet Prep:  Trich: negative  Clue cells: negative  Hyphae: occ  Buds: negative  WBC's: normal

## 2017-05-31 NOTE — MR AVS SNAPSHOT
Visit Information Date & Time Provider Department Dept. Phone Encounter #  
 5/31/2017  2:20 PM Patience Hurtado MD [facility] [phone] [id number] Upcoming Health Maintenance Date Due  
 HPV AGE 9Y-34Y (1 of 3 - Female 3 Dose Series) 1/14/2003 INFLUENZA AGE 9 TO ADULT 8/1/2017 PAP AKA CERVICAL CYTOLOGY 5/16/2019 Allergies as of 5/31/2017  Review Complete On: 3/27/2017 By: Augustin Isbell RN No Known Allergies Current Immunizations  Never Reviewed No immunizations on file. Not reviewed this visit Vitals BP Resp Height(growth percentile) Weight(growth percentile) LMP BMI  
 112/64 (BP 1 Location: Left arm, BP Patient Position: Sitting) 18 5' 4\" (1.626 m) 160 lb 6.4 oz (72.8 kg) 05/02/2017 (Exact Date) 27.53 kg/m2 OB Status Smoking Status Having regular periods Never Smoker BMI and BSA Data Body Mass Index Body Surface Area  
 27.53 kg/m 2 1.81 m 2 Preferred Pharmacy Pharmacy Name Phone [id number] [phone] Your Updated Medication List  
  
   
This list is accurate as of: 5/31/17  2:28 PM.  Always use your most recent med list.  
  
  
  
  
 CALCIUM + D PO Take  by mouth. Chews one gummy po once daily. desogestrel-ethinyl estradiol 0.15-0.03 mg Tab Commonly known as:  APRI Take 1 Tab by mouth daily. omeprazole 40 mg capsule Commonly known as:  PRILOSEC Take 1 Cap by mouth daily. Patient Instructions Well Visit, Ages 25 to 48: Care Instructions Your Care Instructions Physical exams can help you stay healthy. Your doctor has checked your overall health and may have suggested ways to take good care of yourself. He or she also may have recommended tests. At home, you can help prevent illness with healthy eating, regular exercise, and other steps. Follow-up care is a key part of your treatment and safety. Be sure to make and go to all appointments, and call your doctor if you are having problems. It's also a good idea to know your test results and keep a list of the medicines you take. How can you care for yourself at home? · Reach and stay at a healthy weight. This will lower your risk for many problems, such as obesity, diabetes, heart disease, and high blood pressure. · Get at least 30 minutes of physical activity on most days of the week. Walking is a good choice. You also may want to do other activities, such as running, swimming, cycling, or playing tennis or team sports. Discuss any changes in your exercise program with your doctor. · Do not smoke or allow others to smoke around you. If you need help quitting, talk to your doctor about stop-smoking programs and medicines. These can increase your chances of quitting for good. · Talk to your doctor about whether you have any risk factors for sexually transmitted infections (STIs). Having one sex partner (who does not have STIs and does not have sex with anyone else) is a good way to avoid these infections. · Use birth control if you do not want to have children at this time. Talk with your doctor about the choices available and what might be best for you. · Protect your skin from too much sun. When you're outdoors from 10 a.m. to 4 p.m., stay in the shade or cover up with clothing and a hat with a wide brim. Wear sunglasses that block UV rays. Even when it's cloudy, put broad-spectrum sunscreen (SPF 30 or higher) on any exposed skin. · See a dentist one or two times a year for checkups and to have your teeth cleaned. · Wear a seat belt in the car. · Drink alcohol in moderation, if at all. That means no more than 2 drinks a day for men and 1 drink a day for women. Follow your doctor's advice about when to have certain tests. These tests can spot problems early. For everyone · Cholesterol. Have the fat (cholesterol) in your blood tested after age 21. Your doctor will tell you how often to have this done based on your age, family history, or other things that can increase your risk for heart disease. · Blood pressure. Have your blood pressure checked during a routine doctor visit. Your doctor will tell you how often to check your blood pressure based on your age, your blood pressure results, and other factors. · Vision. Talk with your doctor about how often to have a glaucoma test. 
· Diabetes. Ask your doctor whether you should have tests for diabetes. · Colon cancer. Have a test for colon cancer at age 48. You may have one of several tests. If you are younger than 48, you may need a test earlier if you have any risk factors. Risk factors include whether you already had a precancerous polyp removed from your colon or whether your parent, brother, sister, or child has had colon cancer. For women · Breast exam and mammogram. Talk to your doctor about when you should have a clinical breast exam and a mammogram. Medical experts differ on whether and how often women under 50 should have these tests. Your doctor can help you decide what is right for you. · Pap test and pelvic exam. Begin Pap tests at age 24. A Pap test is the best way to find cervical cancer. The test often is part of a pelvic exam. Ask how often to have this test. 
· Tests for sexually transmitted infections (STIs). Ask whether you should have tests for STIs. You may be at risk if you have sex with more than one person, especially if your partners do not wear condoms. For men · Tests for sexually transmitted infections (STIs). Ask whether you should have tests for STIs. You may be at risk if you have sex with more than one person, especially if you do not wear a condom. · Testicular cancer exam. Ask your doctor whether you should check your testicles regularly. · Prostate exam. Talk to your doctor about whether you should have a blood test (called a PSA test) for prostate cancer. Experts differ on whether and when men should have this test. Some experts suggest it if you are older than 39 and are -American or have a father or brother who got prostate cancer when he was younger than 72. When should you call for help? Watch closely for changes in your health, and be sure to contact your doctor if you have any problems or symptoms that concern you. Where can you learn more? Go to http://jam-jace.info/. Enter P072 in the search box to learn more about \"Well Visit, Ages 25 to 48: Care Instructions. \" Current as of: July 19, 2016 Content Version: 11.2 © 9402-1825 [employer]. Care instructions adapted under license by [employer] (which disclaims liability or warranty for this information). If you have questions about a medical condition or this instruction, always ask your healthcare professional. [place] [address] any warranty or liability for your use of this information. Vaginal Yeast Infection: Care Instructions Your Care Instructions A vaginal yeast infection is caused by too many yeast cells in the vagina. This is common in women of all ages. Itching, vaginal discharge and irritation, and other symptoms can bother you. But yeast infections don't often cause other health problems. Some medicines can increase your risk of getting a yeast infection. These include antibiotics, birth control pills, hormones, and steroids. You may also be more likely to get a yeast infection if you are pregnant, have diabetes, douche, or wear tight clothes. With treatment, most yeast infections get better in 2 to 3 days. Follow-up care is a key part of your treatment and safety.  Be sure to make and go to all appointments, and call your doctor if you are having problems. It's also a good idea to know your test results and keep a list of the medicines you take. How can you care for yourself at home? · Take your medicines exactly as prescribed. Call your doctor if you think you are having a problem with your medicine. · Ask your doctor about over-the-counter (OTC) medicines for yeast infections. They may cost less than prescription medicines. If you use an OTC treatment, read and follow all instructions on the label. · Do not use tampons while using a vaginal cream or suppository. The tampons can absorb the medicine. Use pads instead. · Wear loose cotton clothing. Do not wear nylon or other fabric that holds body heat and moisture close to the skin. · Try sleeping without underwear. · Do not scratch. Relieve itching with a cold pack or a cool bath. · Do not wash your vaginal area more than once a day. Use plain water or a mild, unscented soap. Air-dry the vaginal area. · Change out of wet swimsuits after swimming. · Do not have sex until you have finished your treatment. · Do not douche. When should you call for help? Call your doctor now or seek immediate medical care if: 
· You have unexpected vaginal bleeding. · You have new or increased pain in your vagina or pelvis. Watch closely for changes in your health, and be sure to contact your doctor if: 
· You have a fever. · You are not getting better after 2 days. · Your symptoms come back after you finish your medicines. Where can you learn more? Go to http://jam-jace.info/. Enter B345 in the search box to learn more about \"Vaginal Yeast Infection: Care Instructions. \" Current as of: October 13, 2016 Content Version: 11.2 © 4282-0039 [employer] Care instructions adapted under license by [employer] (which disclaims liability or warranty for this information).  If you have questions about a medical condition or this instruction, always ask your healthcare professional. [address]  any warranty or liability for your use of this information. Introducing [facility]! [facility] introduces [employer] patient portal. Now you can access parts of your medical record, email your doctor's office, and request medication refills online. 1. In your internet browser, go to [email or web address] 2. Click on the First Time User? Click Here link in the Sign In box. You will see the New Member Sign Up page. 3. Enter your [facility] Access Code exactly as it appears below. You will not need to use this code after youve completed the sign-up process. If you do not sign up before the expiration date, you must request a new code. · [employer] Access Code: [email or web address] Expires: 8/29/2017  2:28 PM 
 
4. Enter the last four digits of your Social Security Number (xxxx) and Date of Birth (mm/dd/yyyy) as indicated and click Submit. You will be taken to the next sign-up page. 5. Create a [facility] ID. This will be your [employer] login ID and cannot be changed, so think of one that is secure and easy to remember. 6. Create a [employer] password. You can change your password at any time. 7. Enter your Password Reset Question and Answer. This can be used at a later time if you forget your password. 8. Enter your e-mail address. You will receive e-mail notification when new information is available in [address] 9. Click Sign Up. You can now view and download portions of your medical record. 10. Click the Download Summary menu link to download a portable copy of your medical information. If you have questions, please visit the Frequently Asked Questions section of the [employer] website. Remember, [employer] [facility] is NOT to be used for urgent needs. For medical emergencies, dial 911. Now available from your iPhone and Android! Please provide this summary of care documentation to your next provider. Your primary care clinician is listed as Bryn Lind. If you have any questions after today's visit, please call [phone].

## 2017-05-31 NOTE — TELEPHONE ENCOUNTER
They can use the generic, but it still may not be covered. She can do topical 1% hydrocortisone (externally) and monistat 7 for similar symptom relief. Or just take diflucan.

## 2017-06-06 RX ORDER — FLUCONAZOLE 150 MG/1
150 TABLET ORAL
Qty: 12 TAB | Refills: 0 | Status: SHIPPED | OUTPATIENT
Start: 2017-06-06 | End: 2017-09-04

## 2017-06-06 NOTE — PROGRESS NOTES
Abnormal results. Yeast, tx'd at visit  B/c of recurrent yeast infections  Can try diflucan for recurrent yeast : rx sent  Rec fu visit 3 months, at least one week after completes prolonged diflucan course or if sx recur while on suppression.   rx sent

## 2017-06-07 ENCOUNTER — TELEPHONE (OUTPATIENT)
Dept: OBGYN CLINIC | Age: 27
End: 2017-06-07

## 2017-06-07 NOTE — PROGRESS NOTES
Patient notified of results and Md recommendations. Patient verbalized understanding. Patient will contact the office back to schedule 3 month follow up.

## 2017-06-07 NOTE — TELEPHONE ENCOUNTER
Notified patient of abnormal results and Md recommendations. Patient verbalized understanding.     ----- Message from Flex Hammond MD sent at 6/6/2017  7:11 PM EDT -----  Abnormal results. Yeast, tx'd at visit  B/c of recurrent yeast infections  Can try diflucan for recurrent yeast : rx sent  Rec fu visit 3 months, at least one week after completes prolonged diflucan course or if sx recur while on suppression.   rx sent

## 2017-07-13 LAB
A VAGINAE DNA VAG QL NAA+PROBE: ABNORMAL SCORE
BVAB2 DNA VAG QL NAA+PROBE: ABNORMAL SCORE
C ALBICANS DNA VAG QL NAA+PROBE: POSITIVE
C GLABRATA DNA VAG QL NAA+PROBE: NEGATIVE
C TRACH RRNA SPEC QL NAA+PROBE: NEGATIVE
MEGA1 DNA VAG QL NAA+PROBE: ABNORMAL SCORE
N GONORRHOEA RRNA SPEC QL NAA+PROBE: NEGATIVE
T VAGINALIS RRNA SPEC QL NAA+PROBE: NEGATIVE

## 2018-05-03 ENCOUNTER — TELEPHONE (OUTPATIENT)
Dept: OBGYN CLINIC | Age: 28
End: 2018-05-03

## 2018-05-03 RX ORDER — DESOGESTREL AND ETHINYL ESTRADIOL 0.15-0.03
1 KIT ORAL DAILY
Qty: 1 PACKAGE | Refills: 1 | Status: SHIPPED | OUTPATIENT
Start: 2018-05-03 | End: 2018-06-14 | Stop reason: SDUPTHER

## 2018-05-03 NOTE — TELEPHONE ENCOUNTER
Patient is a patient of [facility], last seen for AE on 5/31/17. She is calling because she was denied refill. Checking on status. New AE appointment has been made for 6/14/18 at 7:50 am with [facility]      RX has been sent for Apri to [facility] to get her through until AE on 6/14/18, Rx confirmed by pharmacy. ,

## 2018-06-14 ENCOUNTER — OFFICE VISIT (OUTPATIENT)
Dept: OBGYN CLINIC | Age: 28
End: 2018-06-14

## 2018-06-14 VITALS
SYSTOLIC BLOOD PRESSURE: 118 MMHG | WEIGHT: 161 LBS | HEIGHT: 64 IN | BODY MASS INDEX: 27.49 KG/M2 | DIASTOLIC BLOOD PRESSURE: 72 MMHG

## 2018-06-14 DIAGNOSIS — Z01.419 ENCOUNTER FOR GYNECOLOGICAL EXAMINATION (GENERAL) (ROUTINE) WITHOUT ABNORMAL FINDINGS: Primary | ICD-10-CM

## 2018-06-14 DIAGNOSIS — N93.9 ABNORMAL UTERINE BLEEDING: ICD-10-CM

## 2018-06-14 RX ORDER — DESOGESTREL AND ETHINYL ESTRADIOL 0.15-0.03
1 KIT ORAL DAILY
Qty: 3 PACKAGE | Refills: 4 | Status: SHIPPED | OUTPATIENT
Start: 2018-06-14 | End: 2019-06-28 | Stop reason: SDUPTHER

## 2018-06-14 NOTE — MR AVS SNAPSHOT
[address] [facility] 
[phone] Patient: [address] [address] MRN: [id number]:[id number] Visit Information Date & Time Provider Department Dept. Phone Encounter #  
 6/14/2018  7:50 AM Collette Bair, MD [address] [id number] Upcoming Health Maintenance Date Due  
 HPV Age 9Y-34Y (1 of 3 - Female 3 Dose Series) 1/14/2003 Influenza Age 5 to Adult 8/1/2018 PAP AKA CERVICAL CYTOLOGY 5/16/2019 Allergies as of 6/14/2018  Review Complete On: 6/14/2018 By: Avis Dangelo LPN No Known Allergies Current Immunizations  Never Reviewed No immunizations on file. Not reviewed this visit Vitals BP Height(growth percentile) Weight(growth percentile) LMP BMI OB Status 118/72 5' 4\" (1.626 m) 161 lb (73 kg) 06/01/2018 27.64 kg/m2 Having regular periods Smoking Status Never Smoker BMI and BSA Data Body Mass Index Body Surface Area  
 27.64 kg/m 2 1.82 m 2 Preferred Pharmacy Pharmacy Name Phone [facility] [address], [facility] [phone] Your Updated Medication List  
  
   
This list is accurate as of 6/14/18  7:52 AM.  Always use your most recent med list.  
  
  
  
  
 CALCIUM + D PO Take  by mouth. Chews one gummy po once daily. desogestrel-ethinyl estradiol 0.15-0.03 mg Tab Commonly known as:  APRI Take 1 Tab by mouth daily. fluconazole 150 mg tablet Commonly known as:  DIFLUCAN Take 1 Tab by mouth daily. omeprazole 40 mg capsule Commonly known as:  PRILOSEC Take 1 Cap by mouth daily. Patient Instructions Well Visit, Ages 25 to 48: Care Instructions Your Care Instructions Physical exams can help you stay healthy. Your doctor has checked your overall health and may have suggested ways to take good care of yourself. He or she also may have recommended tests. At home, you can help prevent illness with healthy eating, regular exercise, and other steps. Follow-up care is a key part of your treatment and safety. Be sure to make and go to all appointments, and call your doctor if you are having problems. It's also a good idea to know your test results and keep a list of the medicines you take. How can you care for yourself at home? · Reach and stay at a healthy weight. This will lower your risk for many problems, such as obesity, diabetes, heart disease, and high blood pressure. · Get at least 30 minutes of physical activity on most days of the week. Walking is a good choice. You also may want to do other activities, such as running, swimming, cycling, or playing tennis or team sports. Discuss any changes in your exercise program with your doctor. · Do not smoke or allow others to smoke around you. If you need help quitting, talk to your doctor about stop-smoking programs and medicines. These can increase your chances of quitting for good. · Talk to your doctor about whether you have any risk factors for sexually transmitted infections (STIs). Having one sex partner (who does not have STIs and does not have sex with anyone else) is a good way to avoid these infections. · Use birth control if you do not want to have children at this time. Talk with your doctor about the choices available and what might be best for you. · Protect your skin from too much sun. When you're outdoors from 10 a.m. to 4 p.m., stay in the shade or cover up with clothing and a hat with a wide brim. Wear sunglasses that block UV rays. Even when it's cloudy, put broad-spectrum sunscreen (SPF 30 or higher) on any exposed skin. · See a dentist one or two times a year for checkups and to have your teeth cleaned. · Wear a seat belt in the car. · Drink alcohol in moderation, if at all.  That means no more than 2 drinks a day for men and 1 drink a day for women. Follow your doctor's advice about when to have certain tests. These tests can spot problems early. For everyone · Cholesterol. Have the fat (cholesterol) in your blood tested after age 21. Your doctor will tell you how often to have this done based on your age, family history, or other things that can increase your risk for heart disease. · Blood pressure. Have your blood pressure checked during a routine doctor visit. Your doctor will tell you how often to check your blood pressure based on your age, your blood pressure results, and other factors. · Vision. Talk with your doctor about how often to have a glaucoma test. 
· Diabetes. Ask your doctor whether you should have tests for diabetes. · Colon cancer. Have a test for colon cancer at age 48. You may have one of several tests. If you are younger than 48, you may need a test earlier if you have any risk factors. Risk factors include whether you already had a precancerous polyp removed from your colon or whether your parent, brother, sister, or child has had colon cancer. For women · Breast exam and mammogram. Talk to your doctor about when you should have a clinical breast exam and a mammogram. Medical experts differ on whether and how often women under 50 should have these tests. Your doctor can help you decide what is right for you. · Pap test and pelvic exam. Begin Pap tests at age 24. A Pap test is the best way to find cervical cancer. The test often is part of a pelvic exam. Ask how often to have this test. 
· Tests for sexually transmitted infections (STIs). Ask whether you should have tests for STIs. You may be at risk if you have sex with more than one person, especially if your partners do not wear condoms. For men · Tests for sexually transmitted infections (STIs). Ask whether you should have tests for STIs.  You may be at risk if you have sex with more than one person, especially if you do not wear a condom. · Testicular cancer exam. Ask your doctor whether you should check your testicles regularly. · Prostate exam. Talk to your doctor about whether you should have a blood test (called a PSA test) for prostate cancer. Experts differ on whether and when men should have this test. Some experts suggest it if you are older than 39 and are -American or have a father or brother who got prostate cancer when he was younger than 72. When should you call for help? Watch closely for changes in your health, and be sure to contact your doctor if you have any problems or symptoms that concern you. Where can you learn more? Go to http://jam-jace.info/. Enter P072 in the search box to learn more about \"Well Visit, Ages 25 to 48: Care Instructions. \" Current as of: May 12, 2017 Content Version: 11.4 © 5029-7066 [employer]. Care instructions adapted under license by [employer] (which disclaims liability or warranty for this information). If you have questions about a medical condition or this instruction, always ask your healthcare professional. [address] any warranty or liability for your use of this information. Introducing [facility]! Karla Boyle introduces TellMi patient portal. Now you can access parts of your medical record, email your doctor's office, and request medication refills online. 1. In your internet browser, go to [email or web address] 2. Click on the First Time User? Click Here link in the Sign In box. You will see the New Member Sign Up page. 3. Enter your TellMi Access Code exactly as it appears below. You will not need to use this code after youve completed the sign-up process. If you do not sign up before the expiration date, you must request a new code. · TellMi Access Code: [email or web address] Expires: 9/12/2018  7:52 AM 
 
 4. Enter the last four digits of your Social Security Number (xxxx) and Date of Birth (mm/dd/yyyy) as indicated and click Submit. You will be taken to the next sign-up page. 5. Create a Gre[employer]ip Software ID. This will be your Gre[employer] login ID and cannot be changed, so think of one that is secure and easy to remember. 6. Create a Gre[employer] password. You can change your password at any time. 7. Enter your Password Reset Question and Answer. This can be used at a later time if you forget your password. 8. Enter your e-mail address. You will receive e-mail notification when new information is available in [address] 9. Click Sign Up. You can now view and download portions of your medical record. 10. Click the Download Summary menu link to download a portable copy of your medical information. If you have questions, please visit the Frequently Asked Questions section of the Gre[employer] website. Remember, Gre[employer] Software is NOT to be used for urgent needs. For medical emergencies, dial 911. Now available from your iPhone and Android! Please provide this summary of care documentation to your next provider. Your primary care clinician is listed as [address] [place] [address] . If you have any questions after today's visit, please call [phone].

## 2018-06-14 NOTE — PROGRESS NOTES
[facility]  [email or web address]  [phone]    Eric Tenorio MD, [facility]       Annual Gynecologic Exam:  [facility] <40  Chief Complaint   Patient presents with    Well Woman         Lusi Solorzano is a 34 y.o.  WHITE [place]  female who presents for an annual well woman exam.  Patient's last menstrual period was 2018. .    With regard to the Gardisil vaccine, she has received all 3 injections. She does not report additional concerns today. Menstrual status:  Her periods are moderate. She does not report dysmenorrhea/painful menses. She does not report irregular bleeding. Sexual history and Contraception:  History   Sexual Activity    Sexual activity: Yes    Partners: Male    Birth control/ protection: Pill     She never use condoms with sexual activity  She does not reports new sexual partner(s) in the last year. The patient does not request STD testing. We recommended testing per CDC guidelines and at patient request.     Preventive Medicine History:  Her most recent Pap smear result: normal was obtained in May 2016  Her most recent HR HPV screen was Negative obtained in   She does not have a history of SHAUN 2, 3 or cervical cancer.      Past Medical History:   Diagnosis Date    Chlamydia     HX OTHER MEDICAL     Gardasil, 3/3 injections    Nausea & vomiting     Pap smear for cervical cancer screening 2013    negative     OB History    Para Term  AB Living   0 0 0 0 0 0   SAB TAB Ectopic Molar Multiple Live Births   0 0 0  0            Past Surgical History:   Procedure Laterality Date    HX ENDOSCOPY  2017    HX HEENT      wisdom teeth removed    HX TONSILLECTOMY       Family History   Problem Relation Age of Onset    No Known Problems Mother     No Known Problems Father      Social History     Social History    Marital status: SINGLE     Spouse name: N/A    Number of children: N/A    Years of education: N/A Occupational History    Not on file. Social History Main Topics    Smoking status: Never Smoker    Smokeless tobacco: Never Used    Alcohol use No    Drug use: No    Sexual activity: Yes     Partners: Male     Birth control/ protection: Pill     Other Topics Concern    Not on file     Social History Narrative       No Known Allergies    Current Outpatient Prescriptions   Medication Sig    desogestrel-ethinyl estradiol (APRI) 0.15-0.03 mg tab Take 1 Tab by mouth daily.  CALCIUM CARBONATE/VITAMIN D3 (CALCIUM + D PO) Take  by mouth. Chews one gummy po once daily.  fluconazole (DIFLUCAN) 150 mg tablet Take 1 Tab by mouth daily.  omeprazole (PRILOSEC) 40 mg capsule Take 1 Cap by mouth daily. No current facility-administered medications for this visit.         Patient Active Problem List   Diagnosis Code    Abnormal uterine bleeding N93.9       Review of Systems - History obtained from the patient  Constitutional: negative for weight loss, fever, night sweats  HEENT: negative for hearing loss, earache, congestion, snoring, sorethroat  CV: negative for chest pain, palpitations, edema  Resp: negative for cough, shortness of breath, wheezing  GI: negative for change in bowel habits, abdominal pain, black or bloody stools  : negative for frequency, dysuria, hematuria  GYN: see HPI  MSK: negative for back pain, joint pain, muscle pain  Breast: negative for breast lumps, nipple discharge, galactorrhea  Skin :negative for itching, rash, hives  Neuro: negative for dizziness, headache, confusion, weakness  Psych: negative for anxiety, depression, change in mood  Heme/lymph: negative for bleeding, bruising, pallor    Physical Exam  Visit Vitals    /72    Ht 5' 4\" (1.626 m)    Wt 161 lb (73 kg)    LMP 06/01/2018    BMI 27.64 kg/m2       Constitutional  · Appearance: well-nourished, well developed, alert, in no acute distress    HENT  · Head and Face: appears normal    Neck  · Inspection/Palpation: normal appearance, no masses or tenderness  · Lymph Nodes: no lymphadenopathy present  · Thyroid: gland size normal, nontender, no nodules or masses present on palpation    Chest  · Respiratory Effort: breathing unlabored  · Auscultation: normal breath sounds    Cardiovascular  · Heart:  · Auscultation: regular rate and rhythm without murmur    Breasts  · Inspection of Breasts: breasts symmetrical, no skin changes, no discharge present, nipple appearance normal, no skin retraction present  · Palpation of Breasts and Axillae: no masses present on palpation, no breast tenderness  · Axillary Lymph Nodes: no lymphadenopathy present    Gastrointestinal  · Abdominal Examination: abdomen non-tender to palpation, normal bowel sounds, no masses present  · Liver and spleen: no hepatomegaly present, spleen not palpable  · Hernias: no hernias identified    Genitourinary  · External Genitalia: normal appearance for age, no discharge present, no tenderness present, no inflammatory lesions present, no masses present  · Vagina: normal vaginal vault without central or paravaginal defects, no discharge present, no inflammatory lesions present, no masses present  · Bladder: non-tender to palpation  · Urethra: appears normal  · Cervix: normal   · Uterus: normal size, shape and consistency  · Adnexa: no adnexal tenderness present, no adnexal masses present  · Perineum: perineum within normal limits, no evidence of trauma, no rashes or skin lesions present  · Anus: anus within normal limits, no hemorrhoids present  · Inguinal Lymph Nodes: no lymphadenopathy present    Skin  · General Inspection: no rash, no lesions identified    Neurologic/Psychiatric  · Mental Status:  · Orientation: grossly oriented to person, place and time  · Mood and Affect: mood normal, affect appropriate    Assessment:  34 y.o.  for well woman exam  Encounter Diagnoses   Name Primary?     Encounter for gynecological examination (general) (routine) without abnormal findings Yes    Abnormal uterine bleeding        Plan:  The patient was counseled about diet, exercise, healthy lifestyle  We discussed self breast exam  We discussed safer sex practices, condom use and risk factors for sexually transmitted diseases. We discussed current pap smear and HR HPV testing guidelines. We recommend follow up one year for routine annual gynecologic exam or sooner prn  We recommend routine follow up with her primary care doctor for management of chronic medical problems and non-gynecologic concerns  Handouts were given to the patient  We discussed calcium/vitamin D/weight bearing exercise and osteoporosis prevention    Discussed risks, benefits and alternatives of OCP/nuvaring/patch: including but not limited to dvt/pe/mi/cva/ca/gi risks. Folllow up:  [x] return for annual well woman exam in one year or sooner if she is having problems  [] follow up and ultrasound  [] 6 months  [] 3 months  [] 6 weeks   [] 1 month    Orders Placed This Encounter    desogestrel-ethinyl estradiol (APRI) 0.15-0.03 mg tab       No results found for any visits on 06/14/18.

## 2018-06-14 NOTE — PATIENT INSTRUCTIONS

## 2019-06-25 NOTE — PROGRESS NOTES
Annual exam ages 21-44    Obdulia Hamm is a ,  34 y.o. female [facility] Patient's last menstrual period was 2019 (approximate). .    She presents for her annual checkup. She is having no significant problems. With regard to the Gardasil vaccine, she has received all 3 injections. Menstrual status:    Her periods are light, moderate in flow. She is using one to two pads or tampons per day, usually regular and last 26-30 days. She denies dysmenorrhea. She reports no premenstrual symptoms. Contraception:    The current method of family planning is OCP (estrogen/progesterone). Sexual history:     She  reports that she currently engages in sexual activity and has had partners who are Male. She reports using the following method of birth control/protection: Pill. .    Medical conditions:    Since her last annual GYN exam about one year ago, she has not the following changes in her health history: none. Pap and Mammogram History:    Her most recent Pap smear was normal, obtained 3 year(s) ago. The patient has never had a mammogram.    Breast Cancer History/Substance Abuse: negative    Past Medical History:   Diagnosis Date    Chlamydia     HPV vaccine counseling     Gardasil, 3/3 injections    Pap smear for cervical cancer screening 2013    negative     Past Surgical History:   Procedure Laterality Date    HX ENDOSCOPY  2017    HX HEENT      wisdom teeth removed    HX TONSILLECTOMY         Current Outpatient Medications   Medication Sig Dispense Refill    desogestrel-ethinyl estradiol (APRI) 0.15-0.03 mg tab Take 1 Tab by mouth daily. 3 Package 4    CALCIUM CARBONATE/VITAMIN D3 (CALCIUM + D PO) Take  by mouth. Chews one gummy po once daily.  omeprazole (PRILOSEC) 40 mg capsule Take 1 Cap by mouth daily. 30 Cap 0     Allergies: Patient has no known allergies. Tobacco History:  reports that she has never smoked.  She has never used smokeless tobacco.  Alcohol Abuse:  reports that she does not drink alcohol. Drug Abuse:  reports that she does not use drugs.     Family Medical/Cancer History:   Family History   Problem Relation Age of Onset    No Known Problems Mother     No Known Problems Father         Review of Systems - History obtained from the patient  Constitutional: negative for weight loss, fever, night sweats  HEENT: negative for hearing loss, earache, congestion, snoring, sorethroat  CV: negative for chest pain, palpitations, edema  Resp: negative for cough, shortness of breath, wheezing  GI: negative for change in bowel habits, abdominal pain, black or bloody stools  : negative for frequency, dysuria, hematuria, vaginal discharge  MSK: negative for back pain, joint pain, muscle pain  Breast: negative for breast lumps, nipple discharge, galactorrhea  Skin :negative for itching, rash, hives  Neuro: negative for dizziness, headache, confusion, weakness  Psych: negative for anxiety, depression, change in mood  Heme/lymph: negative for bleeding, bruising, pallor    Physical Exam    Visit Vitals  /72   Ht 5' 4\" (1.626 m)   Wt 157 lb 9.6 oz (71.5 kg)   LMP 06/03/2019 (Approximate)   BMI 27.05 kg/m²       Constitutional  · Appearance: well-nourished, well developed, alert, in no acute distress    HENT  · Head and Face: appears normal    Neck  · Inspection/Palpation: normal appearance, no masses or tenderness  · Lymph Nodes: no lymphadenopathy present  · Thyroid: gland size normal, nontender, no nodules or masses present on palpation    Chest  · Respiratory Effort: breathing unlabored  · Auscultation: normal breath sounds    Cardiovascular  · Heart:  · Auscultation: regular rate and rhythm without murmur    Breasts  · Inspection of Breasts: breasts symmetrical, no skin changes, no discharge present, nipple appearance normal, no skin retraction present  · Palpation of Breasts and Axillae: no masses present on palpation, no breast tenderness  · Axillary Lymph Nodes: no lymphadenopathy present    Gastrointestinal  · Abdominal Examination: abdomen non-tender to palpation, normal bowel sounds, no masses present  · Liver and spleen: no hepatomegaly present, spleen not palpable  · Hernias: no hernias identified    Genitourinary  · External Genitalia: normal appearance for age, no discharge present, no tenderness present, no inflammatory lesions present, no masses present, no atrophy present  · Vagina: normal vaginal vault without central or paravaginal defects, no discharge present, no inflammatory lesions present, no masses present  · Bladder: non-tender to palpation  · Urethra: appears normal  · Cervix: normal   · Uterus: normal size, shape and consistency  · Adnexa: no adnexal tenderness present, no adnexal masses present  · Perineum: perineum within normal limits, no evidence of trauma, no rashes or skin lesions present  · Anus: anus within normal limits, no hemorrhoids present  · Inguinal Lymph Nodes: no lymphadenopathy present    Skin  · General Inspection: no rash, no lesions identified    Neurologic/Psychiatric  · Mental Status:  · Orientation: grossly oriented to person, place and time  · Mood and Affect: mood normal, affect appropriate    . Assessment:  Routine gynecologic examination  Her current medical status is satisfactory with no evidence of significant gynecologic issues. May consider pregnancy this year. Prenatal counseling performed.     Plan:  Counseled re: diet, exercise, healthy lifestyle  Return for yearly wellness visits  Gardisil counseling provided  Pt counseled regarding co-testing for high risk HPV with pap  Rec screening mammo at either 37 or 42

## 2019-06-28 ENCOUNTER — OFFICE VISIT (OUTPATIENT)
Dept: OBGYN CLINIC | Age: 29
End: 2019-06-28

## 2019-06-28 VITALS
HEIGHT: 64 IN | WEIGHT: 157.6 LBS | SYSTOLIC BLOOD PRESSURE: 110 MMHG | BODY MASS INDEX: 26.91 KG/M2 | DIASTOLIC BLOOD PRESSURE: 72 MMHG

## 2019-06-28 DIAGNOSIS — Z01.419 ENCOUNTER FOR GYNECOLOGICAL EXAMINATION (GENERAL) (ROUTINE) WITHOUT ABNORMAL FINDINGS: Primary | ICD-10-CM

## 2019-06-28 RX ORDER — DESOGESTREL AND ETHINYL ESTRADIOL 0.15-0.03
1 KIT ORAL DAILY
Qty: 3 PACKAGE | Refills: 4 | Status: SHIPPED | OUTPATIENT
Start: 2019-06-28 | End: 2020-09-09

## 2019-06-28 NOTE — PATIENT INSTRUCTIONS

## 2019-07-02 LAB
CYTOLOGIST CVX/VAG CYTO: NORMAL
CYTOLOGY CVX/VAG DOC CYTO: NORMAL
CYTOLOGY CVX/VAG DOC THIN PREP: NORMAL
DX ICD CODE: NORMAL
LABCORP, 190119: NORMAL
Lab: NORMAL
OTHER STN SPEC: NORMAL
STAT OF ADQ CVX/VAG CYTO-IMP: NORMAL

## 2019-11-01 RX ORDER — OSELTAMIVIR PHOSPHATE 75 MG/1
75 CAPSULE ORAL DAILY
Qty: 7 CAP | Refills: 0 | Status: SHIPPED | OUTPATIENT
Start: 2019-11-01 | End: 2019-11-08

## 2020-06-02 ENCOUNTER — VIRTUAL VISIT (OUTPATIENT)
Dept: OBGYN CLINIC | Age: 30
End: 2020-06-02

## 2020-06-02 DIAGNOSIS — N97.8 INFERTILITY DUE TO LUTEAL PHASE DEFECT: Primary | ICD-10-CM

## 2020-06-02 NOTE — PROGRESS NOTES
Infertility Evaluation     Anthony Leon is a [de-identified] ,  31 y.o. female [facility] No LMP recorded. who presents with had concerns including Family Planning. .    She and her partner have been attempting pregnancy for 9 months. They have intercourse several times per week. Her menses occur monthly and she has no moliminal symptoms. (breast tenderness, mood changes, bloating, acne). She has done ovulation testing at home for the last 2 months-1st test day 20, 2nd test day 19-her menses started 9 days after each. Her cycles do not seem to be ovulatory to her, she cannot tell. The patient has a risk factor for tubal cause of infertility--hx of CT. Her current partner has not successfully impregnated a previous partner in the past. He has the following risk factors for male infertility: none. He has previously had a sperm analysis a few months ago- normal but decreased normal morphology @ 1%    Previous evaluation:She has had a previous evaluation for infertility consisting of ovulation kits and semen analysis, but no basal body temperature charts, hysterosalpingogram.    She has not consulted in the past with an infertility specialist.     She has questions regarding: the etiology and treatment of (anovulatory, tubal factor, male factor, primary, and secondary) infertility.     Past Medical History:   Diagnosis Date    Chlamydia     HPV vaccine counseling     Gardasil, 3/3 injections    Pap smear for cervical cancer screening 04/23/2013    negative     Past Surgical History:   Procedure Laterality Date    HX ENDOSCOPY  04/2017    HX HEENT      wisdom teeth removed    HX TONSILLECTOMY       Social History     Occupational History    Not on file   Tobacco Use    Smoking status: Never Smoker    Smokeless tobacco: Never Used   Substance and Sexual Activity    Alcohol use: No     Alcohol/week: 0.0 standard drinks    Drug use: No    Sexual activity: Yes     Partners: Male     Birth control/protection: None     Family History   Problem Relation Age of Onset    No Known Problems Mother     No Known Problems Father        No Known Allergies  Prior to Admission medications    Medication Sig Start Date End Date Taking? Authorizing Provider   desogestrel-ethinyl estradiol (APRI) 0.15-0.03 mg tab Take 1 Tab by mouth daily. 6/28/19   Khadijah Morales MD   CALCIUM CARBONATE/VITAMIN D3 (CALCIUM + D PO) Take  by mouth. Chews one gummy po once daily. Provider, Historical   omeprazole (PRILOSEC) 40 mg capsule Take 1 Cap by mouth daily. 1/31/17   Irina Fenton MD        Review of Systems: History obtained from the patient  Constitutional: negative for weight loss, fever, night sweats  Breast: negative for breast lumps, nipple discharge, galactorrhea  GI: negative for change in bowel habits, abdominal pain, black or bloody stools  : negative for frequency, dysuria, hematuria, vaginal discharge  MSK: negative for back pain, joint pain, muscle pain  Skin: negative for itching, rash, hives  Psych: negative for anxiety, depression, change in mood    Objective: There were no vitals taken for this visit. Physical Exam:     Constitutional  · Appearance: well-nourished, well developed, alert, in no acute distress    Skin  · General Inspection: no rash, no lesions identified    Neurologic/Psychiatric  · Mental Status:  · Orientation: grossly oriented to person, place and time  · Mood and Affect: mood normal, affect appropriate    Assessment:  Technically not infertility (only 9 months), but possibly has two factors preventing pregnancy, tubal (h/o CT), and inadequate luteal phase. Advised should start with HSG -- likely not covered by insurance anyway. Plan:   Schedule HSG at [facility] radiology. I spent 25 minutes with the patient, more than half of which was face to face counseling.

## 2020-06-02 NOTE — Clinical Note
Please schedule for HSG with [facility] radiology. Have them contact her with charges for this.

## 2020-06-02 NOTE — PATIENT INSTRUCTIONS
Infertility: Care Instructions Your Care Instructions Infertility means that you haven't been able to get pregnant after trying for at least 1 year. It doesn't mean you'll never get pregnant. A woman's chances of getting pregnant are higher when she's younger. A woman is most able to get pregnant (fertile) in her late 25s. Then, in her mid-30s, she becomes less fertile. This is because her eggs get older. Trouble getting pregnant can be caused by a problem with the reproductive organs of a woman, a man, or both. Talk with your doctor about testing and treatment. If you have tests, you will likely start with a hormone test. This is a test for both of you. And then the man will probably have a semen test. 
There is a wide range of treatment options. They include medicines, surgery, insemination, and in vitro fertilization (IVF). Follow-up care is a key part of your treatment and safety. Be sure to make and go to all appointments, and call your doctor if you are having problems. It's also a good idea to know your test results and keep a list of the medicines you take. How can you care for yourself at home? For women · Take a multivitamin with folic acid. This helps to prevent birth defects if you do become pregnant. · Get regular exercise. But do not overdo it. Really hard and long exercise can cause you to release an egg less often. · Eat healthy foods. And drink lots of water. · Stay at a healthy weight. This will increase your chances of getting pregnant. Women who weigh too much or too little can be less fertile. · Talk to your doctor about all medicines you are taking or may take. This includes over-the-counter and prescribed medicines and herbal remedies. Some medicines interfere with pregnancy. · Write down when your period starts and stops for a few months. Bring that information to your doctor.  He or she can help you figure out when you ovulate and are most likely to get pregnant if you have sex. Or you may prefer to use a home ovulation test. 
For men · Avoid hot tubs and saunas. · If you get sick and have a fever, try to control your fever. A high fever may reduce your sperm count for months. · If you exercise very hard most days of the week, reduce how much exercise you do. Hard, long exercise may lower your sperm count. · Eat a healthy diet and stay at a healthy weight. Limit alcohol to 2 drinks a day. For both men and women · If the woman knows when she will ovulate, try to have sex once a day for the 4 days before ovulation and on the day of ovulation. If the man has a low sperm count, have sex every other day. · If the woman does not know when she will ovulate, have sex 2 or 3 times each week. · Don't use lubricants during sex. They may affect how well sperm can travel to meet the woman's egg. · Avoid smoking and illegal drugs. When should you call for help? Watch closely for changes in your health, and be sure to contact your doctor if you have any problems. Where can you learn more? Go to http://jam-jace.info/ Enter [id number] [id number] in the search box to learn more about \"Infertility: Care Instructions. \" Current as of: February 11, 2020               Content Version: 12.5 © 0935-7151 [employer]. Care instructions adapted under license by [employer] (which disclaims liability or warranty for this information). If you have questions about a medical condition or this instruction, always ask your healthcare professional. [place] [address] any warranty or liability for your use of this information.

## 2020-06-04 DIAGNOSIS — N97.8 INFERTILITY DUE TO LUTEAL PHASE DEFECT: Primary | ICD-10-CM

## 2020-06-29 ENCOUNTER — HOSPITAL ENCOUNTER (OUTPATIENT)
Dept: GENERAL RADIOLOGY | Age: 30
Discharge: HOME | End: 2020-06-29
Attending: OBSTETRICS & GYNECOLOGY
Payer: COMMERCIAL

## 2020-06-29 DIAGNOSIS — N97.8 INFERTILITY DUE TO LUTEAL PHASE DEFECT: ICD-10-CM

## 2020-06-29 PROCEDURE — 58340 CATHETER FOR HYSTEROGRAPHY: CPT

## 2020-06-29 PROCEDURE — 77030012242 HC CATH HYSTSAL STER -B

## 2020-06-29 PROCEDURE — 74011636320 HC RX REV CODE- 636/320: Performed by: RADIOLOGY

## 2020-06-29 RX ADMIN — IOHEXOL 10 ML: 350 INJECTION, SOLUTION INTRAVENOUS at 13:42

## 2020-07-07 NOTE — PROGRESS NOTES
Ultrasound followup    Robb Morales is a 31 y.o. female is here today to review the results of her ultrasound evaluation. Her U/S evaluation is performed because of a previous encounter revealing fibroids after having an HSG which was identified 1 week ago. She is here for an initial ultrasound study. The sonogram: UTERUS IS ANTEVERTED, NORMAL IN SIZE AND ECHOGENICITY. ENDOMETRIUM MEASURES 5-6MM IN THICKNESS. NO EVIDENCE OF MASS OR ABNORMALITY SEEN  WITHIN THE ENDOMETRIAL CAVITY. RIGHT OVARY APPEARS WITHIN NORMAL LIMITS. LEFT OVARY APPEARS WITHIN NORMAL LIMITS. A FOLLICULAR CYST IS SEEN. NO FREE FLUID SEEN IN THE CDS. .    See detailed report for more information. HSG abnormality not see on US. Menses are normal in flow. Pt advised of results and implications. Agrees to attempt pregnancy with or without clomid for 3-6 months and get back to us. I spent 15 minutes with the patient, more than half of which was face to face counseling.

## 2020-07-09 ENCOUNTER — OFFICE VISIT (OUTPATIENT)
Dept: OBGYN CLINIC | Age: 30
End: 2020-07-09

## 2020-07-09 DIAGNOSIS — N97.8 INFERTILITY DUE TO LUTEAL PHASE DEFECT: Primary | ICD-10-CM

## 2020-09-09 ENCOUNTER — VIRTUAL VISIT (OUTPATIENT)
Dept: FAMILY MEDICINE CLINIC | Age: 30
End: 2020-09-09
Payer: COMMERCIAL

## 2020-09-09 DIAGNOSIS — Z31.9 PATIENT DESIRES PREGNANCY: ICD-10-CM

## 2020-09-09 DIAGNOSIS — M53.3 COCCYX PAIN: Primary | ICD-10-CM

## 2020-09-09 DIAGNOSIS — Z76.89 ESTABLISHING CARE WITH NEW DOCTOR, ENCOUNTER FOR: ICD-10-CM

## 2020-09-09 PROCEDURE — 99214 OFFICE O/P EST MOD 30 MIN: CPT | Performed by: FAMILY MEDICINE

## 2020-09-09 NOTE — PROGRESS NOTES
Chief Complaint   Patient presents with   Alyssa Niño [facility]    Tailbone Pain     2/10   1. Have you been to the ER, urgent care clinic since your last visit? Hospitalized since your last visit? No    2. Have you seen or consulted any other health care providers outside of the [address] [place] [address] since your last visit? Include any pap smears or colon screening.  No

## 2020-09-09 NOTE — PROGRESS NOTES
Zoe Lux is a 31 y.o. female who was seen by synchronous (real-time) audio-video technology on 9/9/2020. Consent: Zoe Lux, who was seen by synchronous (real-time) audio-video technology, and/or her healthcare decision maker, is aware that this patient-initiated, Telehealth encounter on 9/9/2020 is a billable service, with coverage as determined by her insurance carrier. She is aware that she may receive a bill and has provided verbal consent to proceed: Yes. Assessment & Plan:   1. Coccyx pain  Recommend PT eval, pt defers NSAIDS as she is actively TTC and she does not want to take extra medications. Sometimes coccygeal pain becomes worse in pregnancy  Defer imaging based on initial presentation without trauma  Try supportive care with donut cushions  - REFERRAL TO PHYSICAL THERAPY    2. Establishing care with new doctor, encounter for  Discussed hm  Due for flu shot this fall  Will get TDAP in her pregnancy between 27 and 36 wk in general, will defer to that time, she had childhood vaccines and is UTD she reports      3. Patient desires pregnancy  Mgmt per her gyn, she is actively TTC, confirmed she is on a PNV          Pt was counseled on risks, benefits and alternatives of treatment options. All questions were asked and answered and the patient was agreeable with the treatment plan as outlined. Encounter time today was 30 minutes and more than 50% of this encounter was spent in counseling face-to-face regarding Diagnosis, Patient Education, Medication Management, Compliance and Impressions. Time documented includes face to face time, documentation and chart review if applicable except as noted. Subjective:    Zoe Lux is a 31 y.o. female who was seen for Establish Care and Tailbone Pain (2/10)      Home: house with   Work: work   Last PCP: \" a while, not gone on a regular basis\"  Eye doctor: wearing glasses, not within the last year, she wears blue light glasses  Dentist: yes goes regularly    Exercise: walking with dog  Diet: relatively healthy  Weight: stable, weighs about 155lbs 5'4in    Tob: no  Etoh: occassional etoh  Illicit drugs: no    ?sa: yes one male partner  OB: Dr Merlinda Goodnight  Not on birth control, on clomid, hoping to grow her family    Was sitting in a kitchen chair for some time when working from home  Having tailbone pain  Trying to stay off nsaids because she is working on growing her family  No trauma  No pain with defecation  No incontinence of urine  No pain with intercourse  Pain is variable, it is aching in most cases, it does not hurt when standing or walking, does not hurt when laying on stomach or side. If sitting in car or if sitting in chair it is a burning and aching pain. Has gotten an adaptive chair device with a \"cutout\" at sacrum but it isn't helping. Medications, allergies, PMH, PSH, SOCH, [address] reviewed and updated per routine protocol, see chart for review and changes if not noted here. ROS  A 12 point review of systems was negative except as noted here or in the HPI.     Objective:   Vital Signs: (As obtained by patient/caregiver at home)  Patient-Reported Vitals 9/9/2020   Patient-Reported Weight 155   Patient-Reported Height 54   Patient-Reported LMP 08/26/2020        [INSTRUCTIONS:  \"[x]\" Indicates a positive item  \"[]\" Indicates a negative item  -- DELETE ALL ITEMS NOT EXAMINED]    Constitutional: [x] Appears well-developed and well-nourished [x] No apparent distress      [] Abnormal -     Mental status: [x] Alert and awake  [x] Oriented to person/place/time [x] Able to follow commands    [] Abnormal -     Eyes:   EOM    [x]  Normal    [] Abnormal -   Sclera  [x]  Normal    [] Abnormal -          Discharge [x]  None visible   [] Abnormal -     HENT: [x] Normocephalic, atraumatic  [] Abnormal -   [x] Mouth/Throat: Mucous membranes are moist    External Ears [x] Normal  [] Abnormal -    Neck: [x] No visualized mass [] Abnormal -     Pulmonary/Chest: [x] Respiratory effort normal   [x] No visualized signs of difficulty breathing or respiratory distress        [] Abnormal -      Musculoskeletal:   [] Normal gait with no signs of ataxia         [x] Normal range of motion of neck        [] Abnormal -     Neurological:        [x] No Facial Asymmetry (Cranial nerve 7 motor function) (limited exam due to video visit)          [x] No gaze palsy        [] Abnormal -          Skin:        [x] No significant exanthematous lesions or discoloration noted on facial skin         [] Abnormal -            Psychiatric:       [x] Normal Affect [] Abnormal -        [x] No Hallucinations    Other pertinent observable physical exam findings:seated at desk, speaking in full sentences, breathing comfortably    We discussed the expected course, resolution and complications of the diagnosis(es) in detail. Medication risks, benefits, costs, interactions, and alternatives were discussed as indicated. I advised her to contact the office if her condition worsens, changes or fails to improve as anticipated. She expressed understanding with the diagnosis(es) and plan. Tony Beltran is a 31 y.o. female who was evaluated by a video visit encounter for concerns as above. Patient identification was verified prior to start of the visit. A caregiver was present when appropriate. Due to this being a TeleHealth encounter (During [facility] public health emergency), evaluation of the following organ systems was limited: Vitals/Constitutional/EENT/Resp/CV/GI//MS/Neuro/Skin/Heme-Lymph-Imm. Pursuant to the emergency declaration under the [facility], 1135 waiver authority and the [employer] and [employer] Act, this Virtual  Visit was conducted, with patient's (and/or legal guardian's) consent, to reduce the patient's risk of exposure to COVID-19 and provide necessary medical care. Services were provided through a video synchronous discussion virtually to substitute for in-person clinic visit. Patient and provider were located at their individual homes. Yared Gray MD  [facility]  09/09/20 9:58 AM     Portions of this note may have been populated using smart dictation software and may have \"sounds-like\" errors present.

## 2020-09-22 RX ORDER — CLOMIPHENE CITRATE 50 MG/1
TABLET ORAL
Qty: 5 TAB | Refills: 0 | Status: SHIPPED | OUTPATIENT
Start: 2020-09-22 | End: 2022-02-17

## 2020-09-22 NOTE — TELEPHONE ENCOUNTER
Patient advised of MD recommendations and prescription refilled. Patient placed on the schedule to be seen for AE on 11/3/2020 at 8:20am    Patient verbalized understanding.

## 2020-09-22 NOTE — TELEPHONE ENCOUNTER
Hopefully we can do all the AE stuff when she gets pregnant. Go ahead and schedule her for AE in November.

## 2020-09-22 NOTE — TELEPHONE ENCOUNTER
31year old patient last seen for AE on 6/28/2019 and was seen in the office on     7/9/2020 for fertility issues      ?  Refill has requested when over due for AE    Please advise      Please amend/sign pended prescription

## 2020-09-28 ENCOUNTER — HOSPITAL ENCOUNTER (OUTPATIENT)
Dept: PHYSICAL THERAPY | Age: 30
Discharge: HOME | End: 2020-09-28
Payer: COMMERCIAL

## 2020-09-28 PROCEDURE — 97162 PT EVAL MOD COMPLEX 30 MIN: CPT | Performed by: PHYSICAL MEDICINE & REHABILITATION

## 2020-09-28 PROCEDURE — 97140 MANUAL THERAPY 1/> REGIONS: CPT | Performed by: PHYSICAL MEDICINE & REHABILITATION

## 2020-09-28 PROCEDURE — 97530 THERAPEUTIC ACTIVITIES: CPT | Performed by: PHYSICAL MEDICINE & REHABILITATION

## 2020-09-28 NOTE — PROGRESS NOTES
[address] [facility] [address]  Phone: [phone]  Fax: [phone]    Plan of Care/ Statement of Necessity for Physical Therapy Services 2-15    Patient name: Trell Vazquez  : 1990  Provider#: [id number]  Referral source: Delores Schaeffer MD      Medical/Treatment Diagnosis: Sacrococcygeal disorders, not elsewhere classified [M53.3]     Prior Hospitalization: see medical history     Comorbidities: See chart  Prior Level of Function: no coccyx pain  Medications: Verified on Patient Summary List    Start of Care: 20      Onset Date: 2020      The [address] [place] [address] and following information is based on the information from the initial evaluation. 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 She denies any trauma, falls. She also complains of low back pain that predates the coccyx pain but is worse recently. She is back in the office in her old chair that she previously tolerated well and the pain is persisting. She is using a wedge seat cushion with sacral cut out with not much relief, tends to fold her legs under her and sit on her ankles. Overall pain is staying the same. Pt with complaints of coccyx and low back pain in sitting presents with decreased core strength, poor posture habits. I was unable to reproduce her pain with direct palpation to coccyx. Pelvic structure alignment is good. She responded very well to improved posture with supports in sitting. She will benefit from skilled PT services to address her limitations and achieve her functional goals as stated on the plan of care.         Evaluation Complexity History MEDIUM  Complexity : 1-2 comorbidities / personal factors will impact the outcome/ POC ; Examination MEDIUM Complexity : 3 Standardized tests and measures addressing body structure, function, activity limitation and / or participation in recreation  ;Presentation MEDIUM Complexity : Evolving with changing characteristics  ; Clinical Decision Making MEDIUM Complexity : FOTO score of 26-74  Overall Complexity Rating: MEDIUM    Problem List: pain affecting function, decrease ROM, decrease strength, decrease ADL/ functional abilitiies, decrease activity tolerance, decrease flexibility/ joint mobility and decrease transfer abilities   Treatment Plan may include any combination of the following: Therapeutic exercise, Therapeutic activities, Neuromuscular re-education, Physical agent/modality, Gait/balance training, Manual therapy, Patient education and Self Care training  Patient / Family readiness to learn indicated by: asking questions  Persons(s) to be included in education: patient (P)  Barriers to Learning/Limitations: None  Patient Goal (s): sit with no pain  Patient Self Reported Health Status: excellent  Rehabilitation Potential: excellent    Short Term Goals: To be accomplished in 6 weeks:  Pt will be independent with a progressive HEP  Pt will demonstrate good posture with sitting, standing, transitional ADLs with proper supports  Pt will have no pain with palpation to lumbar paraspinals in order to lay supine with no pain   Pt will tolerate sitting at work 30 minutes at a time with no increased pain    Long Term Goals: To be accomplished in 12 weeks:  Pt will demonstrate full functional ROM with no pain in order to lay prone with no pain   Pt will have no strength deficit through abdominal musculature in order to stand with no lumbar pain   Pt will have no pain with sitting 1 hour at work. Frequency / Duration: Patient to be seen 1 times per week for 6-12 weeks.     Patient/ Caregiver education and instruction: self care and activity modification    [x]  Plan of care has been reviewed with JUAN Perez PT, MSPT 9/28/2020     ________________________________________________________________________    I certify that the above Therapy Services are being furnished while the patient is under my care. I agree with the treatment plan and certify that this therapy is necessary.     [de-identified] Signature:____________________  Date:____________Time: _________

## 2020-09-28 NOTE — PROGRESS NOTES
PT INITIAL EVALUATION NOTE 2-15    Patient Name: Arun Maharaj  Date:2020  : 1990  [x]  Patient  Verified  Payor: Melville Cranker / Plan: Erma Ashford / Product Type: HMO /    In time: 0845     Out time  Total Treatment Time (min): 60  Visit #: 1    Treatment Area: Sacrococcygeal disorders, not elsewhere classified [M53.3]    SUBJECTIVE  Pain Level (0-10 scale):  4/10 when sitting back 0/10 sitting forward  Any medication changes, allergies to medications, adverse drug reactions, diagnosis change, or new procedure performed?: [] No    [x] Yes (see summary sheet for update)  Subjective:     Patient is a 31year old female with complaints of coccyx pain that started 2020 around the time she was working from home full time, 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 She denies any trauma, falls. She also complains of low back pain that predates the coccyx pain but is worse recently. She is back in the office in her old chair that she previously tolerated well and the pain is persisting. She is using a wedge seat cushion with sacral cut out with not much relief, tends to fold her legs under her and sit on her ankles. Overall pain is staying the same. She has been trying to Clomid, currently on 3rd round. She has been off BCPs since last summer, TTC since 2019. Pain is reproduced with sitting, reclining back, laying supine. PMH  No back, hip injuries.       PLOF: no coccyx pain   Mechanism of Injury: insidious  Previous Treatment/Compliance: none  PMHx/Surgical Hx: none  Work Hx: siting/sedentary  Living Situation: spouse  Pt Goals: sit with no pain  Barriers: none  Motivation: good  Substance use: none   FABQ Score: FOTO  Cognition: A & O x 4        OBJECTIVE/EXAMINATION    Posture: Slump sitting, forward rounded shoulder posture  Other Observations:  Decreased abdominal tone in sitting and standing  Gait and Functional Mobility: No gait or transfer compensation Palpation: TTP to moderate pressure central L3-5, non tender sacrum, sc joint, coccyx or coccygeal ligaments  Joint Mobility: Pelvis level in standing and supine, no LLD. Stiff through thoracic spine in prone        Lumbar AROM:        R  L  Flexion    Finger tips to ankles, no pain            Extension   Full, reproduces CC lumbar pain            Side Bending   Full bilaterally, no pain        Rotation   Full bilaterally, no pain           Aberrant movements:  Painful arc: [] Yes  [x] No  Instability catch: [] Yes  [x] No  Difficulty returning from flexion: [] Yes  [x] No  Reversal of lumbopelvic rhythm: [] Yes  [x] No      LOWER QUARTER   MUSCLE STRENGTH  KEY       R  L  0 - No Contraction  L1, L2 Psoas  5/5  5/5     1 - Trace   L3 Quads  5  5     2 - Poor   L4 Tib Ant  5  5      3 - Fair    L5 EHL  5  5      4 - Good   S1 FHL  5  5      5 - Normal   S2 Hams  5  5              MMT:  Core strength:  3+/5   Hip abduction: 4/5   Hip extension: 4/5    Neurological: Sensation: Denies sensory changes, no bowel or bladder changes. Special Tests:        Slump: reproduces chief complaint tailbone pain    Frank: tight hip flexors bilaterally, observed        JOVON:  WNL       Long Sit: WNL    SI compression/ Distraction:  No pain   Active SLR: no pain    30 min Therapeutic Activity:  []  See flow sheet :     Sitting posture at work activities:    Lumbar roll  Seat wedge cushion  Foot stool. Practiced proper positioning for various ADLs, work activities. Bed and transfer moblity for [place] protection   Car seat positioning. Pt able to sit during discussion with proper supports with no coccyx pain. Rationale: improve coordination and increase proprioception  to improve the patients ability to sit at work with no pain    10 mins  Manual   PA pressures through mid thoracic with breath coordination to facilitate lumbar ext ROM   Direct Sacral pressures with breath coordination.                With   [] TE   [x] TA   [] neuro   [] other: Patient Education: [x] Review HEP    [] Progressed/Changed HEP based on:   [x] positioning   [x] body mechanics   [x] transfers   [x] heat/ice application    [] other:        Other Objective/Functional Measures: --     Pain Level (0-10 scale) post treatment: 0       ASSESSMENT:   Pt with complaints of coccyx and low back pain in sitting presents with decreased core strength, poor posture habits. I was unable to reproduce her pain with direct palpation to coccyx. Pelvic structure alignment is good. She responded very well to improved posture with supports in sitting. She will benefit from skilled PT services to address her limitations and achieve her functional goals as stated on the plan of care.       [x]  See Plan of Albertina Do, PT, MSPT 9/28/2020

## 2020-10-05 ENCOUNTER — HOSPITAL ENCOUNTER (OUTPATIENT)
Dept: PHYSICAL THERAPY | Age: 30
Discharge: HOME | End: 2020-10-05
Payer: COMMERCIAL

## 2020-10-05 PROCEDURE — 97110 THERAPEUTIC EXERCISES: CPT | Performed by: PHYSICAL MEDICINE & REHABILITATION

## 2020-10-05 NOTE — PROGRESS NOTES
PT DAILY TREATMENT NOTE 2-15    Patient Name: Kristen Little  Date:10/5/2020  : 1990  [x]  Patient  Verified  Payor: Rock Palacios / Plan: Hamzah  / Product Type: HMO /    In time: 3889  Out time: 100  Total Treatment Time (min): 45  Visit #:  2    Treatment Area: Sacrococcygeal disorders, not elsewhere classified [M53.3]    SUBJECTIVE  Pain Level (0-10 scale): 2  Any medication changes, allergies to medications, adverse drug reactions, diagnosis change, or new procedure performed?: [x] No    [] Yes (see summary sheet for update)  Subjective functional status/changes:   [] No changes reported  Noted a big improvement in sit tolerance with use of lumbar roll and seat wedge  She has ordered standing desk      OBJECTIVE      45 min Therapeutic Exercise:  [] See flow sheet :    Access Code: Josias Sloanskharriet   URL: [employer]/   Date: 10/05/2020   Prepared by: Martha Dilling     Exercises   Supine Transversus Abdominis Bracing with Pelvic Floor Contraction - 5 reps - 5 sets - 1x daily - 7x weekly   Supine March with Posterior Pelvic Tilt - 5 reps - 5 sets - 1x daily - 7x weekly   Bent Knee Fallouts - 5 reps - 5 sets - 1x daily - 7x weekly   Dead Bug Alternating Arm Extension - 5 reps - 5 sets - 1x daily - 7x weekly   Dead Bug - 5 reps - 5 sets - 1x daily - 7x weekly   Supine Bridge with Spinal Articulation - 10 reps - 2 sets - 1x daily - 7x weekly      Rationale: increase strength and improve coordination to improve the patients ability to sit with no pain       With   [x] TE   [] TA   [] neuro   [] other: Patient Education: [x] Review HEP    [] Progressed/Changed HEP based on:   [] positioning   [] body mechanics   [] transfers   [] heat/ice application    [] other:      Other Objective/Functional Measures:   Demonstrated good understanding of exs provided today      Pain Level (0-10 scale) post treatment:  0     ASSESSMENT/Changes in Function:     Patient will continue to benefit from skilled PT services to modify and progress therapeutic interventions to attain remaining goals. [x]  See Plan of Care  []  See progress note/recertification  []  See Discharge Summary         Progress towards goals / Updated goals:  Able to advance exercises today with good tolerance. Pt continues to need instruction for correct exercise form and performance. Continues to demonstrate good potential to achieve functional goals stated on the plan of care.       PLAN  [x]  Upgrade activities as tolerated     []  Continue plan of care  []  Update interventions per flow sheet       []  Discharge due to:_  []  Other:_      Froylan Arreola PT, MSPT 10/5/2020

## 2020-10-12 ENCOUNTER — HOSPITAL ENCOUNTER (OUTPATIENT)
Dept: PHYSICAL THERAPY | Age: 30
Discharge: HOME | End: 2020-10-12
Payer: COMMERCIAL

## 2020-10-12 PROCEDURE — 97110 THERAPEUTIC EXERCISES: CPT | Performed by: PHYSICAL MEDICINE & REHABILITATION

## 2020-10-12 NOTE — PROGRESS NOTES
PT DAILY TREATMENT NOTE 2-15    Patient Name: Cyndee Crawley  Date:10/12/2020  : 1990  [x]  Patient  Verified  Payor: Mellisa Tabares / Plan: Sky Mark / Product Type: HMO /    In time:    Out time: 010  Total Treatment Time (min): 39  Visit #: 3    Treatment Area: Sacrococcygeal disorders, not elsewhere classified [M53.3]    SUBJECTIVE  Pain Level (0-10 scale):   0  Any medication changes, allergies to medications, adverse drug reactions, diagnosis change, or new procedure performed?: [x] No    [] Yes (see summary sheet for update)  Subjective functional status/changes:   [] No changes reported  Using standing desk part of her work day, this is helping  Using lumbar roll and seat wedge while sitting, notes big improvement. Did have coccyx pain flare after painting over the weekend. OBJECTIVE      45 min Therapeutic Exercise:  [] See flow sheet :    Access Code: [email or web address]   URL: [employer]/   Date: 10/12/2020   Prepared by: Maribel Velazco     Exercises   Standing Hip Flexor Stretch - 4 reps - 30 seconds hold - 1x daily - 7x weekly   Seated Figure 4 Piriformis Stretch - 4 reps - 30 seconds hold - 1x daily - 7x weekly   Seated Hamstring Stretch - 4 reps - 1x daily - 7x weekly   Prone Press Up - 4 reps - 10-30 seconds hold - 1x daily - 7x weekly   Supine Bridge - 10 reps - 2 sets - 1x daily - 7x weekly   Supine Bridge with Resistance Band - 5 reps - 5 sets - 1x daily - 7x weekly   Supine Bridge with Mini Swiss Ball Between Knees - 5 reps - 5 sets - 1x daily - 7x weekly      Rationale: increase ROM, increase strength and increase proprioception to improve the patients ability to sit with no pelvic pain.              With   [x] TE   [] TA   [] neuro   [] other: Patient Education: [x] Review HEP    [] Progressed/Changed HEP based on:   [] positioning   [] body mechanics   [] transfers   [] heat/ice application    [] other:      Other Objective/Functional Measures:  --     Pain Level (0-10 scale) post treatment: 0    ASSESSMENT/Changes in Function:   Able to progress TrAB strengthening exs. Patient will continue to benefit from skilled PT services to modify and progress therapeutic interventions to attain remaining goals. [x]  See Plan of Care  []  See progress note/recertification  []  See Discharge Summary         Progress towards goals / Updated goals:  Able to advance exercises today with good tolerance. Pt continues to need instruction for correct exercise form and performance. Continues to demonstrate good potential to achieve functional goals stated on the plan of care.       PLAN  [x]  Upgrade activities as tolerated     []  Continue plan of care  []  Update interventions per flow sheet       []  Discharge due to:_  []  Other:_      Jeremiah Humphreys PT, MSPT 10/12/2020

## 2020-10-15 ENCOUNTER — PATIENT MESSAGE (OUTPATIENT)
Dept: FAMILY MEDICINE CLINIC | Age: 30
End: 2020-10-15

## 2020-10-15 DIAGNOSIS — M53.3 COCCYX PAIN: Primary | ICD-10-CM

## 2020-10-19 ENCOUNTER — HOSPITAL ENCOUNTER (OUTPATIENT)
Dept: PHYSICAL THERAPY | Age: 30
Discharge: HOME | End: 2020-10-19
Payer: COMMERCIAL

## 2020-10-19 PROCEDURE — 97110 THERAPEUTIC EXERCISES: CPT | Performed by: PHYSICAL MEDICINE & REHABILITATION

## 2020-10-19 PROCEDURE — 97140 MANUAL THERAPY 1/> REGIONS: CPT | Performed by: PHYSICAL MEDICINE & REHABILITATION

## 2020-10-19 NOTE — PROGRESS NOTES
PT DAILY TREATMENT NOTE 2-15    Patient Name: Daniella Alva  Date:10/19/2020  : 1990  [x]  Patient  Verified  Payor: Garrick Rodriguez / Plan: Kayla Stevens / Product Type: HMO /    In time: 86   Out time: 010  Total Treatment Time (min): 45  Visit #:  4    Treatment Area: Sacrococcygeal disorders, not elsewhere classified [M53.3]    SUBJECTIVE  Pain Level (0-10 scale): Any medication changes, allergies to medications, adverse drug reactions, diagnosis change, or new procedure performed?: [x] No    [] Yes (see summary sheet for update)  Subjective functional status/changes:   [] No changes reported  Had flare up of pain across the sacrum and into tip of the tailbone for several days. Feels the bridge work may have aggravated her symptoms. Is TTC, will test tomorrow, anxious about this. OBJECTIVE  Tender over L5 centrally and along sacrococcygeal joint. Tender L lumbar paraspinals at L4/5.         30 min Therapeutic Exercise:  [x] See flow sheet :  Added PFMT to supine hooklying TrAB work. Rationale: increase strength and improve coordination to improve the patients ability to sit with no pain     15 min Manual Therapy:  L4-5 gapping in R sidelying over towel roll, STM to L lumbar paraspinals, downward glide L. Prone sacral base traction. Rationale: decrease pain and decrease trigger points  to improve the patients ability to sit with no pain          With   [x] TE   [] TA   [] neuro   [] other: Patient Education: [x] Review HEP    [x] Progressed/Changed HEP based on:   [x] positioning   [] body mechanics   [] transfers   [] heat/ice application    [] other:      Other Objective/Functional Measures:  --     Pain Level (0-10 scale) post treatment: 0    ASSESSMENT/Changes in Function:     Patient will continue to benefit from skilled PT services to modify and progress therapeutic interventions to attain remaining goals.      [x]  See Plan of Care  []  See progress note/recertification  []  See Discharge Summary         Progress towards goals / Updated goals:  Able to advance exercises today with good tolerance. Pt continues to need instruction for correct exercise form and performance. Continues to demonstrate good potential to achieve functional goals stated on the plan of care.       PLAN  [x]  Upgrade activities as tolerated     []  Continue plan of care  []  Update interventions per flow sheet       []  Discharge due to:_  []  Other:_      Emiliano Kaur, PT, MSPT 10/19/2020

## 2020-10-22 NOTE — TELEPHONE ENCOUNTER
From: Randy Stafford  To: Karly Mcmullen MD  Sent: 10/15/2020 8:48 AM EDT  Subject: Non-Urgent Medical Question    Good Morning! I am not sure PT is having much effect. .. I have been doing some exercises/stretching and different ways to not sit directly on my tailbone. I have a sit/stand desk at work now too! But I still cant sit in a normal chair straight up without my tailbone starting to hurt. I timed myself the other day to see how long it took and after 10 minutes I could feel the pain starting to come on. I wanted to know your thoughts on doing some kind of imaging if I'm not pregnant this time around? Or is there some specialist I need to go to? I just need this to go away!      Let me know your thoughts :)  [phone]

## 2020-10-26 ENCOUNTER — HOSPITAL ENCOUNTER (OUTPATIENT)
Dept: GENERAL RADIOLOGY | Age: 30
Discharge: HOME | End: 2020-10-26
Attending: FAMILY MEDICINE
Payer: COMMERCIAL

## 2020-10-26 ENCOUNTER — HOSPITAL ENCOUNTER (OUTPATIENT)
Dept: PHYSICAL THERAPY | Age: 30
Discharge: HOME | End: 2020-10-26
Payer: COMMERCIAL

## 2020-10-26 DIAGNOSIS — M53.3 COCCYX PAIN: ICD-10-CM

## 2020-10-26 PROCEDURE — 97110 THERAPEUTIC EXERCISES: CPT | Performed by: PHYSICAL MEDICINE & REHABILITATION

## 2020-10-26 PROCEDURE — 72220 X-RAY EXAM SACRUM TAILBONE: CPT

## 2020-10-26 NOTE — PROGRESS NOTES
PT DAILY TREATMENT NOTE 2-15    Patient Name: Naty Lui  Date:10/26/2020  : 1990  [x]  Patient  Verified  Payor: [employer] / Plan: Paulette Campbell / Product Type: HMO /    In time: 8420  Out time: 100  Total Treatment Time (min): 45  Visit #:  5    Treatment Area: Sacrococcygeal disorders, not elsewhere classified [M53.3]    SUBJECTIVE  Pain Level (0-10 scale):  0   Any medication changes, allergies to medications, adverse drug reactions, diagnosis change, or new procedure performed?: [x] No    [] Yes (see summary sheet for update)  Subjective functional status/changes:   [] No changes reported  Car ride 1.5 hours this weekend increased tailbone area pain. Continues to have pain at work (sitting) especially at the end of the day. Having Lumbosacral x-rays today      OBJECTIVE      45 min Therapeutic Exercise:  [] See flow sheet :    Access Code: [email or web address]   URL: [employer]/   Date: 10/26/2020   Prepared by: Ramona Marin     Exercises   Standing Hip Flexor Stretch - 4 reps - 30 seconds hold - 1x daily - 7x weekly   Seated Figure 4 Piriformis Stretch - 4 reps - 30 seconds hold - 1x daily - 7x weekly   Seated Hamstring Stretch - 4 reps - 1x daily - 7x weekly   Prone Press Up - 4 reps - 10-30 seconds hold - 1x daily - 7x weekly   Supine Bridge - 10 reps - 2 sets - 1x daily - 7x weekly   Supine Bridge with Resistance Band - 5 reps - 5 sets - 1x daily - 7x weekly   Supine Bridge with Mini Swiss Ball Between Knees - 5 reps - 5 sets - 1x daily - 7x weekly   Prone Hip Flexor Stretch on Table with Strap - 4 reps - 30 seconds hold - 1x daily - 7x weekly   Clamshell - 10 reps - 3 sets - 1x daily - 7x weekly   Sidelying Hip Abduction - 10 reps - 3 sets - 1x daily - 7x weekly   Sidelying Reverse Clamshell - 10 reps - 3 sets - 1x daily - 7x weekly        Rationale: increase strength and improve coordination to improve the patients ability to work with no increased LBP               With [x] TE   [] TA   [] neuro   [] other: Patient Education: [x] Review HEP    [] Progressed/Changed HEP based on:   [] positioning   [] body mechanics   [] transfers   [] heat/ice application    [] other:      Other Objective/Functional Measures: --     Pain Level (0-10 scale) post treatment: 0    ASSESSMENT/Changes in Function:     Patient will continue to benefit from skilled PT services to modify and progress therapeutic interventions to attain remaining goals. [x]  See Plan of Care  []  See progress note/recertification  []  See Discharge Summary         Progress towards goals / Updated goals:  Able to advance exercises today with good tolerance. Pt continues to need instruction for correct exercise form and performance. Continues to demonstrate good potential to achieve functional goals stated on the plan of care.       PLAN  [x]  Upgrade activities as tolerated     []  Continue plan of care  []  Update interventions per flow sheet       []  Discharge due to:_  []  Other:_      Rochester Severin, PT, MSPT  10/26/2020

## 2020-11-02 ENCOUNTER — HOSPITAL ENCOUNTER (OUTPATIENT)
Dept: PHYSICAL THERAPY | Age: 30
Discharge: HOME | End: 2020-11-02
Payer: COMMERCIAL

## 2020-11-02 PROCEDURE — 97110 THERAPEUTIC EXERCISES: CPT | Performed by: PHYSICAL MEDICINE & REHABILITATION

## 2020-11-02 PROCEDURE — 97530 THERAPEUTIC ACTIVITIES: CPT | Performed by: PHYSICAL MEDICINE & REHABILITATION

## 2020-11-02 NOTE — PATIENT INSTRUCTIONS
Well Visit, Ages 25 to 48: Care Instructions Your Care Instructions Physical exams can help you stay healthy. Your doctor has checked your overall health and may have suggested ways to take good care of yourself. He or she also may have recommended tests. At home, you can help prevent illness with healthy eating, regular exercise, and other steps. Follow-up care is a key part of your treatment and safety. Be sure to make and go to all appointments, and call your doctor if you are having problems. It's also a good idea to know your test results and keep a list of the medicines you take. How can you care for yourself at home? · Reach and stay at a healthy weight. This will lower your risk for many problems, such as obesity, diabetes, heart disease, and high blood pressure. · Get at least 30 minutes of physical activity on most days of the week. Walking is a good choice. You also may want to do other activities, such as running, swimming, cycling, or playing tennis or team sports. Discuss any changes in your exercise program with your doctor. · Do not smoke or allow others to smoke around you. If you need help quitting, talk to your doctor about stop-smoking programs and medicines. These can increase your chances of quitting for good. · Talk to your doctor about whether you have any risk factors for sexually transmitted infections (STIs). Having one sex partner (who does not have STIs and does not have sex with anyone else) is a good way to avoid these infections. · Use birth control if you do not want to have children at this time. Talk with your doctor about the choices available and what might be best for you. · Protect your skin from too much sun. When you're outdoors from 10 a.m. to 4 p.m., stay in the shade or cover up with clothing and a hat with a wide brim. Wear sunglasses that block UV rays. Even when it's cloudy, put broad-spectrum sunscreen (SPF 30 or higher) on any exposed skin. · See a dentist one or two times a year for checkups and to have your teeth cleaned. · Wear a seat belt in the car. Follow your doctor's advice about when to have certain tests. These tests can spot problems early. For everyone · Cholesterol. Have the fat (cholesterol) in your blood tested after age 21. Your doctor will tell you how often to have this done based on your age, family history, or other things that can increase your risk for heart disease. · Blood pressure. Have your blood pressure checked during a routine doctor visit. Your doctor will tell you how often to check your blood pressure based on your age, your blood pressure results, and other factors. · Vision. Talk with your doctor about how often to have a glaucoma test. 
· Diabetes. Ask your doctor whether you should have tests for diabetes. · Colon cancer. Your risk for colorectal cancer gets higher as you get older. Some experts say that adults should start regular screening at age 48 and stop at age 76. Others say to start before age 48 or continue after age 76. Talk with your doctor about your risk and when to start and stop screening. For women · Breast exam and mammogram. Talk to your doctor about when you should have a clinical breast exam and a mammogram. Medical experts differ on whether and how often women under 50 should have these tests. Your doctor can help you decide what is right for you. · Cervical cancer screening test and pelvic exam. Begin with a Pap test at age 24. The test often is part of a pelvic exam. Starting at age 27, you may choose to have a Pap test, an HPV test, or both tests at the same time (called co-testing). Talk with your doctor about how often to have testing. · Tests for sexually transmitted infections (STIs). Ask whether you should have tests for STIs. You may be at risk if you have sex with more than one person, especially if your partners do not wear condoms. For men · Tests for sexually transmitted infections (STIs). Ask whether you should have tests for STIs. You may be at risk if you have sex with more than one person, especially if you do not wear a condom. · Testicular cancer exam. Ask your doctor whether you should check your testicles regularly. · Prostate exam. Talk to your doctor about whether you should have a blood test (called a PSA test) for prostate cancer. Experts differ on whether and when men should have this test. Some experts suggest it if you are older than 39 and are -American or have a father or brother who got prostate cancer when he was younger than 72. When should you call for help? Watch closely for changes in your health, and be sure to contact your doctor if you have any problems or symptoms that concern you. Where can you learn more? Go to http://www.beltran.com/ Enter P072 in the search box to learn more about \"Well Visit, Ages 25 to 48: Care Instructions. \" Current as of: May 27, 2020               Content Version: 12.6 © 2006-2020 [employer]. Care instructions adapted under license by [employer] (which disclaims liability or warranty for this information). If you have questions about a medical condition or this instruction, always ask your healthcare professional. [address] any warranty or liability for your use of this information.

## 2020-11-02 NOTE — PROGRESS NOTES
Annual exam ages 21-44    Sarah Ford is a [de-identified] P5,  31 y.o. female   Patient's last menstrual period was 10/22/2020 (exact date). She presents for her annual checkup. She is having no significant problems. She is no longer taking Clomid-she is going to [facility] for evaluation. With regard to the Gardasil vaccine, she has received all 3 injections. Menstrual status:    Her periods are normal in flow. She is using three to ten pads or tampons per day, usually regular with a 26-32 day interval with 3-7 day duration. She does not have dysmenorrhea. She reports no premenstrual symptoms. Contraception:    The current method of family planning is none. Sexual history:    She  reports being sexually active and has had partner(s) who are Male. She reports using the following method of birth control/protection: None. Medical conditions:    Since her last annual GYN exam about one year ago, she has not the following changes in her health history: none. Surgical history confirmed with patient. has a past surgical history that includes hx tonsillectomy; hx heent; hx endoscopy (04/2017); and hx gyn (06/30/2020). Pap and Mammogram History:    Her most recent Pap smear was normal, obtained 1 year(s) ago. The patient has never had a mammogram.    Breast Cancer History/Substance Abuse: negative    Past Medical History:   Diagnosis Date    Chlamydia     HPV vaccine counseling     Gardasil, 3/3 injections    Pap smear for cervical cancer screening 04/23/2013    negative     Past Surgical History:   Procedure Laterality Date    HX ENDOSCOPY  04/2017    HX GYN  06/30/2020    Hysterosalpingogram    HX HEENT      wisdom teeth removed    HX TONSILLECTOMY         Current Outpatient Medications   Medication Sig Dispense Refill    PNV Comb #2-Iron-Omega 3-FA [id number] mg cmpk Take 1 Tab by mouth daily.       clomiPHENE (CLOMID) 50 mg tablet One PO every day days 5-9 of cycle 5 Tab 0  CALCIUM CARBONATE/VITAMIN D3 (CALCIUM + D PO) Take  by mouth. Chews one gummy po once daily. Allergies: Patient has no known allergies. Tobacco History:  reports that she has never smoked. She has never used smokeless tobacco.  Alcohol Abuse:  reports no history of alcohol use. Drug Abuse:  reports no history of drug use.     Family Medical/Cancer History:   Family History   Problem Relation Age of Onset    No Known Problems Mother     No Known Problems Father         Review of Systems - History obtained from the patient  Constitutional: negative for weight loss, fever, night sweats  HEENT: negative for hearing loss, earache, congestion, snoring, sorethroat  CV: negative for chest pain, palpitations, edema  Resp: negative for cough, shortness of breath, wheezing  GI: negative for change in bowel habits, abdominal pain, black or bloody stools  : negative for frequency, dysuria, hematuria, vaginal discharge  MSK: negative for back pain, joint pain, muscle pain  Breast: negative for breast lumps, nipple discharge, galactorrhea  Skin :negative for itching, rash, hives  Neuro: negative for dizziness, headache, confusion, weakness  Psych: negative for anxiety, depression, change in mood  Heme/lymph: negative for bleeding, bruising, pallor    Physical Exam    Visit Vitals  /72   Ht 5' 4\" (1.626 m)   Wt 162 lb (73.5 kg)   LMP 10/22/2020 (Exact Date)   BMI 27.81 kg/m²       Constitutional  · Appearance: well-nourished, well developed, alert, in no acute distress    HENT  · Head and Face: appears normal    Neck  · Inspection/Palpation: normal appearance, no masses or tenderness  · Lymph Nodes: no lymphadenopathy present  · Thyroid: gland size normal, nontender, no nodules or masses present on palpation    Chest  · Respiratory Effort: breathing unlabored  · Auscultation: normal breath sounds    Cardiovascular  · Heart:  · Auscultation: regular rate and rhythm without murmur    Breasts  · Inspection of Breasts: breasts symmetrical, no skin changes, no discharge present, nipple appearance normal, no skin retraction present  · Palpation of Breasts and Axillae: no masses present on palpation, no breast tenderness  · Axillary Lymph Nodes: no lymphadenopathy present    Gastrointestinal  · Abdominal Examination: abdomen non-tender to palpation, normal bowel sounds, no masses present  · Liver and spleen: no hepatomegaly present, spleen not palpable  · Hernias: no hernias identified    Genitourinary  · External Genitalia: normal appearance for age, no discharge present, no tenderness present, no inflammatory lesions present, no masses present, no atrophy present  · Vagina: normal vaginal vault without central or paravaginal defects, no discharge present, no inflammatory lesions present, no masses present  · Bladder: non-tender to palpation  · Urethra: appears normal  · Cervix: normal   · Uterus: normal size, shape and consistency  · Adnexa: no adnexal tenderness present, no adnexal masses present  · Perineum: perineum within normal limits, no evidence of trauma, no rashes or skin lesions present  · Anus: anus within normal limits, no hemorrhoids present  · Inguinal Lymph Nodes: no lymphadenopathy present    Skin  · General Inspection: no rash, no lesions identified    Neurologic/Psychiatric  · Mental Status:  · Orientation: grossly oriented to person, place and time  · Mood and Affect: mood normal, affect appropriate    . Assessment:  Routine gynecologic examination  Her current medical status is satisfactory with no evidence of significant gynecologic issues.     Plan:  Counseled re: diet, exercise, healthy lifestyle  Return for yearly wellness visits  Gardisil counseling provided  Pt counseled regarding co-testing for high risk HPV with pap  Rec screening mammo at either 37 or 42

## 2020-11-02 NOTE — PROGRESS NOTES
PT DAILY TREATMENT NOTE 2-15    Patient Name: Domingo Rey  Date:2020  : 1990  [x]  Patient  Verified  Payor: Danial Runner / Plan: Chele Atkins / Product Type: HMO /    In time:   Out time: 011  Total Treatment Time (min): 60  Visit #:  6    Treatment Area: Sacrococcygeal disorders, not elsewhere classified [M53.3]    SUBJECTIVE  Pain Level (0-10 scale): 4  Any medication changes, allergies to medications, adverse drug reactions, diagnosis change, or new procedure performed?: [x] No    [] Yes (see summary sheet for update)  Subjective functional status/changes:   [] No changes reported  Continues to have flares of pain with prolonged sitting. Sat on the edge of her office chair and was able to reproduce her pain. X-ray came back clear   Not taking fertility meds this month, will undergo further testing. Sees OB tomorrow     Has had difficulty with constipation for years, has BM every 2-3 days, usually hard stools. OBJECTIVE    Given that pt is not currently pregnant she decided to move forward with internal exam of pelvic floor musculature and coccyx     External Pelvic Exam  Non tender through external pelvic clock  No POP at rest or with sustained valsalva  Active contraction:   Active relaxation:   Involuntary relaxation:     Internal Vaginal Exam:  Layer 1  Non tender   Layer 2/3  Non tender, moderately hypertonic, equal bilaterally    Bladder neck mobility:  NT  Posterior vaginal wall:  Noted significant amount of hard stool palpable in the rectum   Obturator and piriformis non tender    Internal Rectal Exam:   Hypertonic EAS/IAS limiting exam  Reproduced chief complaint pain with direct palpation to the anterior surface of the coccyx  Unable to palpate coccygeus due to heavy guarding at EAS/IAS limiting reach. Coccyx mobility is WNL and painfree. Both vaginally and rectally pt demonstrates normal strength, coordination and endurance of the levator ani complex.      27 min Therapeutic Exercise:  [x] See flow sheet :     Rationale: increase strength and improve coordination to improve the patients ability to sit with no pain    30 min Therapeutic Activity:  []  See flow sheet :    Patient instructed in the role of physical therapy in the evaluation and treatment of pelvic floor dysfunction, applicable treatment modalities discussed. Vaginal and rectal exam procedure reviewed step by step. Patient instructed in pelvic floor anatomy and function including levator ani, puborectalis and superficial pelvic  musculature. Patient was provided dietary information to improve stool quality including increasing soluble fiber intake (Bran Buds), probiotics (Activia yogurt) and increased water intake (6-8 glasses a day). Pt instructed in use of [place] stool chart to track progress. Pt to try Metamucil daily x 1 week. Patient educated in proper defecation posture and technique including use of Squatty Potty for better puborectalis ms relaxation. Pt to avoid straining to pass stool in order to decrease strain on pelvic floor. Rationale: Improve patient understanding, change daily habits  to improve the patients ability to eliminate constipation. sit with no pain             With   [x] TE   [] TA   [] neuro   [] other: Patient Education: [x] Review HEP    [] Progressed/Changed HEP based on:   [] positioning   [] body mechanics   [] transfers   [] heat/ice application    [x] other:   Above     Other Objective/Functional Measures:   No pain post exam     Pain Level (0-10 scale) post treatment: 0    ASSESSMENT/Changes in Function:    Able to reproduce chief complaint pain with moderate pressure to anterior surface of coccyx. Noted significant constipation today. Today focused on constipation relieving strategies to see if this reduces tailbone pain, improves sitting tolerance. Continue stab exs.      Patient will continue to benefit from skilled PT services to modify and progress therapeutic interventions to attain remaining goals. [x]  See Plan of Care  []  See progress note/recertification  []  See Discharge Summary         Progress towards goals / Updated goals:  Able to advance exercises today with good tolerance. Pt continues to need instruction for correct exercise form and performance. Continues to demonstrate good potential to achieve functional goals stated on the plan of care.       PLAN  [x]  Upgrade activities as tolerated     []  Continue plan of care  []  Update interventions per flow sheet       []  Discharge due to:_  []  Other:_      Jeanine Contreras PT, MSPT  11/2/2020

## 2020-11-03 ENCOUNTER — OFFICE VISIT (OUTPATIENT)
Dept: OBGYN CLINIC | Age: 30
End: 2020-11-03
Payer: COMMERCIAL

## 2020-11-03 VITALS
WEIGHT: 162 LBS | DIASTOLIC BLOOD PRESSURE: 72 MMHG | BODY MASS INDEX: 27.66 KG/M2 | HEIGHT: 64 IN | SYSTOLIC BLOOD PRESSURE: 120 MMHG

## 2020-11-03 DIAGNOSIS — Z01.419 ENCOUNTER FOR GYNECOLOGICAL EXAMINATION (GENERAL) (ROUTINE) WITHOUT ABNORMAL FINDINGS: Primary | ICD-10-CM

## 2020-11-03 PROCEDURE — 99395 PREV VISIT EST AGE 18-39: CPT | Performed by: OBSTETRICS & GYNECOLOGY

## 2020-11-05 PROCEDURE — 77030040922 HC BLNKT HYPOTHRM STRY -A

## 2020-11-05 PROCEDURE — 77030040361 HC SLV COMPR DVT MDII -B

## 2020-11-09 ENCOUNTER — HOSPITAL ENCOUNTER (OUTPATIENT)
Dept: PHYSICAL THERAPY | Age: 30
Discharge: HOME | End: 2020-11-09
Payer: COMMERCIAL

## 2020-11-09 ENCOUNTER — HOSPITAL ENCOUNTER (OUTPATIENT)
Dept: CT IMAGING | Age: 30
Discharge: HOME | End: 2020-11-09
Attending: FAMILY MEDICINE
Payer: COMMERCIAL

## 2020-11-09 DIAGNOSIS — M53.3 COCCYX PAIN: ICD-10-CM

## 2020-11-09 PROCEDURE — 97110 THERAPEUTIC EXERCISES: CPT | Performed by: PHYSICAL MEDICINE & REHABILITATION

## 2020-11-09 PROCEDURE — 97530 THERAPEUTIC ACTIVITIES: CPT | Performed by: PHYSICAL MEDICINE & REHABILITATION

## 2020-11-09 PROCEDURE — 72192 CT PELVIS W/O DYE: CPT

## 2020-11-09 NOTE — PROGRESS NOTES
PT DAILY TREATMENT NOTE 2-15    Patient Name: Abhi Phillips  Date:2020  : 1990  [x]  Patient  Verified  Payor: Apolinar Reasons / Plan: Krunal Darling PPO / Product Type: PPO /    In time: 9778  Out time: 011  Total Treatment Time (min): 60  Visit #:  7    Treatment Area: Sacrococcygeal disorders, not elsewhere classified [M53.3]    SUBJECTIVE  Pain Level (0-10 scale): 4  Any medication changes, allergies to medications, adverse drug reactions, diagnosis change, or new procedure performed?: [x] No    [] Yes (see summary sheet for update)  Subjective functional status/changes:   [] No changes reported  Continues to have flares of pain with prolonged sitting. Sat on the edge of her office chair and was able to reproduce her pain. Notes less tailbone pain with the Metamucil, she is having more frequent BM, easier to pass. Having CT scan today. Continues to struggle with sitting. Has new insurance with high copay, wants to hold PT until CT results are in. OBJECTIVE    Given that pt is not currently pregnant she decided to move forward with internal exam of pelvic floor musculature and coccyx     External Pelvic Exam  Non tender through external pelvic clock  No POP at rest or with sustained valsalva  Active contraction:   Active relaxation:   Involuntary relaxation:     Internal Vaginal Exam:  Layer 1  Non tender   Layer 2/3  Non tender, moderately hypertonic, equal bilaterally    Bladder neck mobility:  NT  Posterior vaginal wall:  Noted significant amount of hard stool palpable in the rectum   Obturator and piriformis non tender    Internal Rectal Exam:   Hypertonic EAS/IAS limiting exam  Reproduced chief complaint pain with direct palpation to the anterior surface of the coccyx  Unable to palpate coccygeus due to heavy guarding at EAS/IAS limiting reach. Coccyx mobility is WNL and painfree.      Both vaginally and rectally pt demonstrates normal strength, coordination and endurance of the levator ani complex. 30 min Therapeutic Exercise:  [x] See flow sheet :   Advanced HEP. Rationale: increase strength and improve coordination to improve the patients ability to sit with no pain    15 min Therapeutic Activity:  []  See flow sheet :    SItting, body mechanics, lifting. Rationale: Improve patient understanding, change daily habits  to improve the patients ability to eliminate constipation. sit with no pain           With   [x] TE   [] TA   [] neuro   [] other: Patient Education: [x] Review HEP    [] Progressed/Changed HEP based on:   [] positioning   [] body mechanics   [] transfers   [] heat/ice application    [x] other:   Above     Other Objective/Functional Measures:   No pain post exam     Pain Level (0-10 scale) post treatment: 0    ASSESSMENT/Changes in Function:    Able to reproduce chief complaint pain with moderate pressure to anterior surface of coccyx. Noted significant constipation today. Today focused on constipation relieving strategies to see if this reduces tailbone pain, improves sitting tolerance. Continue stab exs. Patient will continue to benefit from skilled PT services to modify and progress therapeutic interventions to attain remaining goals. [x]  See Plan of Care  []  See progress note/recertification  []  See Discharge Summary         Progress towards goals / Updated goals:  Able to advance exercises today with good tolerance. Pt continues to need instruction for correct exercise form and performance. Continues to demonstrate good potential to achieve functional goals stated on the plan of care. PLAN  []  Upgrade activities as tolerated     []  Continue plan of care  []  Update interventions per flow sheet       []  Discharge due to:_  [x]  Other: Hold PT until CT results.        Stacie Scott, PT, MSPT  11/9/2020

## 2020-11-16 ENCOUNTER — APPOINTMENT (OUTPATIENT)
Dept: PHYSICAL THERAPY | Age: 30
End: 2020-11-16
Payer: COMMERCIAL

## 2020-11-23 ENCOUNTER — APPOINTMENT (OUTPATIENT)
Dept: PHYSICAL THERAPY | Age: 30
End: 2020-11-23
Payer: COMMERCIAL

## 2020-11-30 ENCOUNTER — APPOINTMENT (OUTPATIENT)
Dept: PHYSICAL THERAPY | Age: 30
End: 2020-11-30
Payer: COMMERCIAL

## 2021-01-11 NOTE — PROGRESS NOTES
Physical Therapy at [facility],   a part of  [facility]  [address] [facility], [address] [place] [address] Drive  Phone: [phone]  Fax: [phone]    Discharge Summary  2-15    Patient name: Khoa Curtis  : 1990  Provider#: [id number]  Referral source: Aria Knowles MD      Medical/Treatment Diagnosis: Sacrococcygeal disorders, not elsewhere classified [M53.3]     Prior Hospitalization: see medical history     Comorbidities: See Plan of Care  Prior Level of Function:See Plan of Care  Medications: Verified on Patient Summary List    Start of Care: 20      Onset Date: 2020   Visits from Start of Care: 6     Missed Visits: 0  Reporting Period : 20 - 21      ASSESSMENT/SUMMARY OF CARE:  Mrs Lisa Regalado was referred to PT for evaluation and treatment of LBP/coccyx pain of insidious onset. Treatment consisted of manual therapy, therapeutic exercise, therapeutic activity, patient education, posture and body mechanics training, modalities, home exercise program development and progression. She made improvements with decreased severity of pain, improved sitting tolerance and improved functional ability with PT efforts. She continues to have low grade coccyx pain with prolonged sitting. Pelvic CT negative for pubic rami, SIJ, coccyx abnormality. She is successfully using a sit/stand desk and seating supports to reduce symptoms. She is independent with a home exercise program. She is discharged with partial goals met. Short Term Goals: To be accomplished in 6 weeks:  Pt will be independent with a progressive HEP   MET  Pt will demonstrate good posture with sitting, standing, transitional ADLs with proper supports  MET  Pt will have no pain with palpation to lumbar paraspinals in order to lay supine with no pain MET  Pt will tolerate sitting at work 30 minutes at a time with no increased pain MET     Long Term Goals:  To be accomplished in 12 weeks:  Pt will demonstrate full functional ROM with no pain in order to lay prone with no pain   MET  Pt will have no strength deficit through abdominal musculature in order to stand with no lumbar pain MET  Pt will have no pain with sitting 1 hour at work.  PROGRESSING         RECOMMENDATIONS:  [x]Discontinue therapy: [x]Patient has reached or is progressing toward set goals      []Patient is non-compliant or has abdicated      []Due to lack of appreciable progress towards set goals      []Other    Jayne Delgado PT, MSPT     1/11/2021

## 2021-12-02 NOTE — PROGRESS NOTES
Current pregnancy history: Marilee Samano is a 36 y.o. female who presents for the evaluation of IVF pregnancy. Transfer date was 21. No LMP recorded. Patient is pregnant. Ultrasound data:  She had an  ultrasound done by the ultrasound tech today which revealed a viable hadley pregnancy with a gestational age of 17 weeks and 6 days giving an [address] of 2022. Pregnancy symptoms:    Since her LMP she has experienced  urinary frequency, breast tenderness, and nausea. She has not been vomiting over the last few weeks. Associated signs and symptoms which she denies: dysuria, discharge, vaginal bleeding. She states she has  gained weight:  Approximately a few pounds over the last few weeks. Relevant past pregnancy history:   She has the following pregnancy history: None    She has no history of  delivery. Relevant past medical history:(relevant to this pregnancy): noncontributory. Pap/Occupational history:  Last pap smear: 2 years Results: Normal        Substance history: negative for alcohol, tobacco and street drugs. Positive for nothing. Exposure history: There is/are no indoor cat/s in the home. The patient was instructed to not change the cat litter. She admits close contact with children on a regular basis. She has had chicken pox or the vaccine in the past.   Patient denies issues with domestic violence. Genetic Screening/Teratology Counseling: (Includes patient, baby's father, or anyone in either family with:)  3.  Patient's age >/= 28 at [address]?-- no  .   2. Thalassemia ([facility], [facility], [address], or  background): MCV<80?--no.     3.  Neural tube defect (meningomyelocele, spina bifida, anencephaly)?--no.   4.  Congenital heart defect?--no.  5.  Down syndrome?--no.   6.  Chano-Sachs (Protestant, [place])?--no.   7.  Canavan's Disease?--no.   8.  Familial Dysautonomia?--no.   9.  Sickle cell disease or trait ()? --no   The patient has not been tested for sickle trait  10. Hemophilia or other blood disorders?--no. 11.  Muscular dystrophy?--no. 12.  Cystic fibrosis?--no. 13.  Vincent's Chorea?--no. 14.  Mental retardation/autism (if yes was person tested for Fragile X)?--no. 15.  Other inherited genetic or chromosomal disorder?--no. 12.  Maternal metabolic disorder (DM, PKU, etc)?--no. 17.  Patient or FOB with a child with a birth defect not listed above?--no.  17a. Patient or FOB with a birth defect themselves?--no. 18.  Recurrent pregnancy loss, or stillbirth?--no. 19.  Any medications since LMP other than prenatal vitamins (include vitamins, supplements, OTC meds, drugs, alcohol)?--no. 20.  Any other genetic/environmental exposure to discuss?--no. Infection History:  1. Lives with someone with TB or TB exposed?--no.   2.  Patient or partner has history of genital herpes?--no.  3.  Rash or viral illness since LMP?--no.    4.  History of STD (GC, CT, HPV, syphilis, HIV)? --CT  5. Other: OTHER? Past Medical History:   Diagnosis Date    Chlamydia     HPV vaccine counseling     Gardasil, 3/3 injections    Pap smear for cervical cancer screening 04/23/2013    negative     Past Surgical History:   Procedure Laterality Date    HX ENDOSCOPY  04/2017    HX GYN  06/30/2020    Hysterosalpingogram    HX HEENT      wisdom teeth removed    HX TONSILLECTOMY       Social History     Occupational History    Not on file   Tobacco Use    Smoking status: Never Smoker    Smokeless tobacco: Never Used   Substance and Sexual Activity    Alcohol use: No     Alcohol/week: 0.0 standard drinks    Drug use: No    Sexual activity: Yes     Partners: Male     Birth control/protection: None     Family History   Problem Relation Age of Onset    No Known Problems Mother     No Known Problems Father        No Known Allergies  Prior to Admission medications    Medication Sig Start Date End Date Taking?  Authorizing Provider   clomiPHENE (CLOMID) 50 mg tablet One PO every day days 5-9 of cycle 9/22/20   Benita Méndez MD   PNV Comb #2-Iron-Omega 3-FA [id number] mg cmpk Take 1 Tab by mouth daily. Provider, Historical   CALCIUM CARBONATE/VITAMIN D3 (CALCIUM + D PO) Take  by mouth. Chews one gummy po once daily.     Provider, Historical        Review of Systems: History obtained from the patient  Constitutional: negative for weight loss, fever, night sweats  HEENT: negative for hearing loss, earache, congestion, snoring, sorethroat  CV: negative for chest pain, palpitations, edema  Resp: negative for cough, shortness of breath, wheezing  Breast: negative for breast lumps, nipple discharge, galactorrhea  GI: negative for change in bowel habits, abdominal pain, black or bloody stools  : negative for frequency, dysuria, hematuria, vaginal discharge  MSK: negative for back pain, joint pain, muscle pain  Skin: negative for itching, rash, hives  Neuro: negative for dizziness, headache, confusion, weakness  Psych: negative for anxiety, depression, change in mood  Heme/lymph: negative for bleeding, bruising, pallor    Objective:  Visit Vitals  /70   Wt 164 lb (74.4 kg)   BMI 28.15 kg/m²       Physical Exam:   PHYSICAL EXAMINATION    Constitutional  · Appearance: well-nourished, well developed, alert, in no acute distress    HENT  · Head  · Face: appears normal  · Eyes: appear normal  · Ears: normal  · Mouth: normal  · Lips: no lesions    Neck  · Inspection/Palpation: normal appearance, no masses or tenderness  · Lymph Nodes: no lymphadenopathy present  · Thyroid: gland size normal, nontender, no nodules or masses present on palpation    Chest  · Respiratory Effort: breathing unlabored  · Auscultation: normal breath sounds    Cardiovascular  · Heart:  · Auscultation: regular rate and rhythm without murmur    Breasts  · Inspection of Breasts: breasts symmetrical, no skin changes, no discharge present, nipple appearance normal, no skin retraction present  · Palpation of Breasts and Axillae: no masses present on palpation, no breast tenderness  · Axillary Lymph Nodes: no lymphadenopathy present    Gastrointestinal  · Abdominal Examination: abdomen non-tender to palpation, normal bowel sounds, no masses present  · Liver and spleen: no hepatomegaly present, spleen not palpable  · Hernias: no hernias identified    Genitourinary  · External Genitalia: normal appearance for age, no discharge present, no tenderness present, no inflammatory lesions present, no masses present, no atrophy present  · Vagina: normal vaginal vault without central or paravaginal defects, no discharge present, no inflammatory lesions present, no masses present  · Bladder: non-tender to palpation  · Urethra: appears normal  · Cervix: normal   · Uterus: enlarged, normal shape, soft  · Adnexa: no adnexal tenderness present, no adnexal masses present  · Perineum: perineum within normal limits, no evidence of trauma, no rashes or skin lesions present  · Anus: anus within normal limits, no hemorrhoids present  · Inguinal Lymph Nodes: no lymphadenopathy present    Skin  · General Inspection: no rash, no lesions identified    Neurologic/Psychiatric  · Mental Status:  · Orientation: grossly oriented to person, place and time  · Mood and Affect: mood normal, affect appropriate    Assessment:   Intrauterine pregnancy with the following problems identified: IVF pregnancy. Plan:     Offered CF testing, CVS, Nuchal Translucency, MSAFP, amnio, and discussed NIPT  Course of pregnancy discussed including visit schedule, routine U/S, glucola testing, etc.  Avoid alcoholic beverages and illicit/recreational drugs use  Take prenatal vitamins or folic acid daily. Hospital and practice style discussed with coverage system.   Discussed nutrition, toxoplasmosis precautions, sexual activity, exercise, need for influenza vaccine, environmental and work hazards, travel advice, screen for domestic violence, need for seat belts. Discussed seafood, unpasteurized dairy products, deli meat, artificial sweeteners, and caffeine. Information on prenatal classes/breastfeeding given. Information on circumcision given  Patient encouraged not to smoke. Discussed current prescription drug use. Given medication list.  Discussed the use of over the counter medications and chemicals. Route of delivery discussed, including risks, benefits, and alternatives of  versus repeat LTCS. Pt understands risk of hemorrhage during pregnancy and post delivery and would accept blood products if necessary in life-threatening emergencies      Handouts given to pt.

## 2021-12-03 ENCOUNTER — ROUTINE PRENATAL (OUTPATIENT)
Dept: OBGYN CLINIC | Age: 31
End: 2021-12-03

## 2021-12-03 VITALS — WEIGHT: 164 LBS | DIASTOLIC BLOOD PRESSURE: 70 MMHG | SYSTOLIC BLOOD PRESSURE: 110 MMHG | BODY MASS INDEX: 28.15 KG/M2

## 2021-12-03 DIAGNOSIS — Z34.00 ENCOUNTER FOR SUPERVISION OF NORMAL FIRST PREGNANCY, UNSPECIFIED TRIMESTER: Primary | ICD-10-CM

## 2021-12-03 DIAGNOSIS — Z11.3 VENEREAL DISEASE SCREENING: ICD-10-CM

## 2021-12-03 PROCEDURE — 0500F INITIAL PRENATAL CARE VISIT: CPT | Performed by: OBSTETRICS & GYNECOLOGY

## 2021-12-08 LAB
C TRACH RRNA SPEC QL NAA+PROBE: NEGATIVE
N GONORRHOEA RRNA SPEC QL NAA+PROBE: NEGATIVE
T VAGINALIS DNA SPEC QL NAA+PROBE: NEGATIVE

## 2021-12-23 ENCOUNTER — ROUTINE PRENATAL (OUTPATIENT)
Dept: OBGYN CLINIC | Age: 31
End: 2021-12-23
Payer: COMMERCIAL

## 2021-12-23 VITALS
SYSTOLIC BLOOD PRESSURE: 118 MMHG | HEIGHT: 64 IN | BODY MASS INDEX: 28.17 KG/M2 | WEIGHT: 165 LBS | DIASTOLIC BLOOD PRESSURE: 78 MMHG

## 2021-12-23 DIAGNOSIS — Z34.02 ENCOUNTER FOR SUPERVISION OF NORMAL FIRST PREGNANCY IN SECOND TRIMESTER: Primary | ICD-10-CM

## 2021-12-23 LAB — AFP, MATERNAL, EXTERNAL: NEGATIVE

## 2021-12-23 PROCEDURE — 0502F SUBSEQUENT PRENATAL CARE: CPT | Performed by: OBSTETRICS & GYNECOLOGY

## 2021-12-23 NOTE — PROGRESS NOTES
Problem List  Date Reviewed: 9/9/2020          Codes Class Noted    Encounter for supervision of normal first pregnancy, unspecified trimester ICD-10-CM: Z34.00  ICD-9-CM: V22.0  12/3/2021    Overview Signed 12/3/2021  3:39 PM by Evangelista Flannery MD     IVF              Abnormal uterine bleeding ICD-10-CM: N93.9  ICD-9-CM: 626.9  4/29/2014    Overview Signed 4/29/2014  2:15 PM by Zenaida Maynard MD     x1 on OCP

## 2021-12-23 NOTE — PATIENT INSTRUCTIONS
Weeks 14 to 18 of Your Pregnancy: Care Instructions  Overview     During this time, you may start to \"show,\" so that you look pregnant to people around you. You may also notice some changes in your skin, such as itchy spots on your palms or acne on your face. Your baby is now able to pass urine. And your baby's first stool (meconium) is starting to collect in your baby's intestines. Hair is also starting to grow on your baby's head. At your next visit, between weeks 18 and 20, your doctor may do an ultrasound test. The test allows your doctor to check for certain problems. Your doctor can also tell the sex of your baby. So this a good time to think about whether you want to know. Talk to your doctor about getting a flu shot to help keep you healthy during your pregnancy. As your pregnancy moves along, it's common to worry or feel anxious. Your body is changing a lot. And you are thinking about giving birth, the health of your baby, and becoming a parent. You can talk to your doctor about any anxiety and stress you feel. Follow-up care is a key part of your treatment and safety. Be sure to make and go to all appointments, and call your doctor if you are having problems. It's also a good idea to know your test results and keep a list of the medicines you take. How can you care for yourself at home? Reduce stress    · Ask for help with cooking and housekeeping.     · Figure out who or what causes your stress. Avoid these people or situations as much as possible.     · Relax every day. Taking 10- to 15-minute breaks can make a big difference. Take a walk, listen to music, or take a warm bath.     · Learn relaxation techniques at prenatal or yoga class. Or buy a relaxation tape.     · List your fears about having a baby and becoming a parent. Share the list with someone you trust. Decide which worries are really small, and try to let them go.    Exercise    · If you did not exercise much before pregnancy, start slowly. Walking is best. [employer], and do a little more every day.     · Brisk walking, easy jogging, low-impact aerobics, water aerobics, and yoga are good choices. Some sports, such as scuba diving, horseback riding, downhill skiing, gymnastics, and water skiing, are not a good idea.     · Try to do at least 2½ hours a week of moderate exercise, such as a fast walk. One way to do this is to be active 30 minutes a day, at least 5 days a week.     · Wear loose clothing. And wear shoes and a bra that provide good support.     · Warm up and cool down to start and finish your exercise.     · If you want to use weights, be sure to use light weights. They reduce stress on your joints. Stay at the best weight for you    · Experts recommend that you gain about 1 pound a month during the first 3 months of your pregnancy.     · Experts recommend that you gain about 1 pound a week during your last 6 months of pregnancy, for a total weight gain of 25 to 35 pounds.     · If you are underweight, you will need to gain more weight (about 28 to 40 pounds).     · If you are overweight, you may not need to gain as much weight (about 15 to 25 pounds).     · If you are gaining weight too fast, use common sense. Exercise every day, and limit sweets, fast foods, and fats. Choose lean meats, fruits, and vegetables.     · If you are having twins or more, your doctor may refer you to a dietitian. Where can you learn more? Go to http://www.gray.com/  Enter I453 in the search box to learn more about \"Weeks 14 to 18 of Your Pregnancy: Care Instructions. \"  Current as of: June 16, 2021               Content Version: 13.0  © 7853-9020 Healthwise, Incorporated. Care instructions adapted under license by [employer] (which disclaims liability or warranty for this information).  If you have questions about a medical condition or this instruction, always ask your healthcare professional. [employer] disclaims any warranty or liability for your use of this information.

## 2021-12-31 LAB
AFP ADJ MOM SERPL: 1.73
AFP INTERP SERPL-IMP: NORMAL
AFP INTERP SERPL-IMP: NORMAL
AFP SERPL-MCNC: 54.9 NG/ML
AGE AT DELIVERY: 30.4 YR
COMMENT, 018013: NORMAL
GA METHOD: NORMAL
GA: 16 WEEKS
IDDM PATIENT QL: NO
MULTIPLE PREGNANCY: NO
NEURAL TUBE DEFECT RISK FETUS: 1514 %
RESULTS, 017004: NORMAL

## 2022-01-20 ENCOUNTER — ROUTINE PRENATAL (OUTPATIENT)
Dept: OBGYN CLINIC | Age: 32
End: 2022-01-20

## 2022-01-20 VITALS — DIASTOLIC BLOOD PRESSURE: 60 MMHG | SYSTOLIC BLOOD PRESSURE: 118 MMHG | WEIGHT: 170 LBS | BODY MASS INDEX: 29.18 KG/M2

## 2022-01-20 DIAGNOSIS — Z34.02 ENCOUNTER FOR SUPERVISION OF NORMAL FIRST PREGNANCY IN SECOND TRIMESTER: Primary | ICD-10-CM

## 2022-01-20 DIAGNOSIS — Z34.00 ENCOUNTER FOR SUPERVISION OF NORMAL FIRST PREGNANCY, UNSPECIFIED TRIMESTER: ICD-10-CM

## 2022-01-20 PROCEDURE — 0502F SUBSEQUENT PRENATAL CARE: CPT | Performed by: OBSTETRICS & GYNECOLOGY

## 2022-01-20 NOTE — PATIENT INSTRUCTIONS
Weeks 18 to 22 of Your Pregnancy: Care Instructions  Overview     Your baby is continuing to develop quickly. Sometime between 18 and 22 weeks, you'll probably start to feel your baby move. At first, these small fetal movements feel like fluttering or \"butterflies. \" Or they may feel like gas bubbles. As your baby grows, these movements will become stronger. You may also notice that your baby hiccups. Babies at this stage can now suck their thumbs. You may find that your nausea and fatigue are gone. You may feel better overall and have more energy than you did in your first trimester. But you might now also have some new discomforts, like sleep problems or leg cramps. Talk to your doctor about things you can do at home to ease these problems. Follow-up care is a key part of your treatment and safety. Be sure to make and go to all appointments, and call your doctor if you are having problems. It's also a good idea to know your test results and keep a list of the medicines you take. How can you care for yourself at home? Ease sleep problems  · Avoid caffeine in drinks or chocolate late in the day. · Get some exercise every day. · Take a warm shower or bath before bed. · Have a light snack or glass of milk at bedtime. · Do relaxation exercises in bed to calm your mind and body. · Support your legs and back with extra pillows. Try a pillow between your legs if you sleep on your side. · Do not use sleeping pills or alcohol. They could harm your baby. Ease leg cramps  · Do not massage your calf during the cramp. · Sit on a firm bed or chair. Straighten your leg, and bend your foot (flex your ankle) slowly upward, toward your knee. Bend your toes up and down. · Stand on a cool, flat surface. Stretch your toes upward, and take small steps walking on your heels. · Use a heating pad or hot water bottle to help with muscle ache. Prevent leg cramps  · Be sure to get enough calcium.  If you are worried that you are not getting enough, talk to your doctor. · Exercise every day, and stretch your legs before bed. · Take a warm bath before bed, and try leg warmers at night. Where can you learn more? Go to http://www.gray.com/  Enter X589 in the search box to learn more about \"Weeks 18 to 22 of Your Pregnancy: Care Instructions. \"  Current as of: June 16, 2021               Content Version: 13.0  © 2006-2021 Healthwise, [employer]. Care instructions adapted under license by [employer] (which disclaims liability or warranty for this information). If you have questions about a medical condition or this instruction, always ask your healthcare professional. [address] any warranty or liability for your use of this information.

## 2022-01-20 NOTE — PROGRESS NOTES
FETAL SURVEY  A SINGLE VIABLE IUP AT 20W0D GA BY LMP IS SEEN. FETAL CARDIAC MOTION OBSERVED. FETAL ANATOMY WELL VISUALIZED AND APPEARS WITHIN NORMAL LIMITS. NO ABNORMALITIES ARE SEEN ON TODAY'S EXAM.  APPROPRIATE FETAL GROWTH IS SEEN. SIZE=DATES. EVA AND CERVIX APPEARS WITHIN NORMAL LIMITS. THERE APPEARS TO BE PLACENTAL TISSUE SEEN ON THE ANTERIOR AND POSTERIOR SIDES OF THE UTERUS,  POSSIBLE BILOBED VS. ACCESSORY LOBE. PLACENTAL CORD INSERTION APPEARS MARGINAL VS. VELAMENTOUS.   GENDER: MALE

## 2022-02-17 ENCOUNTER — ROUTINE PRENATAL (OUTPATIENT)
Dept: OBGYN CLINIC | Age: 32
End: 2022-02-17
Payer: COMMERCIAL

## 2022-02-17 VITALS — BODY MASS INDEX: 30.04 KG/M2 | WEIGHT: 175 LBS | SYSTOLIC BLOOD PRESSURE: 108 MMHG | DIASTOLIC BLOOD PRESSURE: 68 MMHG

## 2022-02-17 DIAGNOSIS — Z34.02 ENCOUNTER FOR SUPERVISION OF NORMAL FIRST PREGNANCY IN SECOND TRIMESTER: Primary | ICD-10-CM

## 2022-02-17 PROCEDURE — 0502F SUBSEQUENT PRENATAL CARE: CPT | Performed by: OBSTETRICS & GYNECOLOGY

## 2022-02-17 NOTE — PATIENT INSTRUCTIONS
Weeks 22 to 26 of Your Pregnancy: Care Instructions  Overview     As you enter your 7th month of pregnancy at week 26, your baby's lungs are growing stronger and getting ready to breathe. You may notice that your baby responds to the sound of your voice. You may also notice that your baby does less turning and twisting and more squirming, kicking, or jerking. Jerking often means that your baby has hiccups. Hiccups are normal and are only temporary. You may want to think about attending a childbirth preparation class. This is also a good time to start thinking about whether you want to have pain medicine during labor. You may be tested for gestational diabetes between weeks 25 and 28. Gestational diabetes occurs when your blood sugar level gets too high when you're pregnant. The test is important, because you can have gestational diabetes and not know it. But the condition can cause problems for your baby. Follow-up care is a key part of your treatment and safety. Be sure to make and go to all appointments, and call your doctor if you are having problems. It's also a good idea to know your test results and keep a list of the medicines you take. How can you care for yourself at home? Ease discomfort from your baby's kicking  · Change your position. Sometimes this will cause your baby to change position too. · Take a deep breath while you raise your arm over your head. Then breathe out while you drop your arm. Do Kegel exercises to prevent urine from leaking  · You can do Kegel exercises while you stand or sit. ? Squeeze the same muscles you would use to stop your urine. Your belly and thighs should not move. ? Hold the squeeze for 3 seconds, and then relax for 3 seconds. ? Start with 3 seconds. Then add 1 second each week until you are able to squeeze for 10 seconds. ? Repeat the exercise 10 to 15 times for each session. Do three or more sessions each day.   Ease or reduce swelling in your feet, ankles, hands, and fingers  · If your fingers are puffy, take off your rings. · Do not eat high-salt foods, such as potato chips. · Prop up your feet on a stool or couch as much as possible. Sleep with pillows under your feet. · Do not stand for long periods of time or wear tight shoes. · Wear support stockings. Where can you learn more? Go to http://www.beltran.com/  Enter G264 in the search box to learn more about \"Weeks 22 to 26 of Your Pregnancy: Care Instructions. \"  Current as of: June 16, 2021               Content Version: 13.0  © 2397-3710 Healthwise, [employer]. Care instructions adapted under license by [employer] (which disclaims liability or warranty for this information). If you have questions about a medical condition or this instruction, always ask your healthcare professional. [address] any warranty or liability for your use of this information.

## 2022-02-18 LAB
ABO + RH BLD: NORMAL
BLOOD GROUP ANTIBODIES SERPL: NORMAL
SPECIMEN EXP DATE BLD: NORMAL

## 2022-02-19 LAB
ERYTHROCYTE [DISTWIDTH] IN BLOOD BY AUTOMATED COUNT: 13.3 % (ref 11.5–14.5)
HBV SURFACE AG SER QL: <0.1 INDEX
HBV SURFACE AG SER QL: NEGATIVE
HCT VFR BLD AUTO: 36 % (ref 35–47)
HGB BLD-MCNC: 11.4 G/DL (ref 11.5–16)
HIV 1+2 AB+HIV1 P24 AG SERPL QL IA: NONREACTIVE
HIV12 RESULT COMMENT, HHIVC: NORMAL
MCH RBC QN AUTO: 29.2 PG (ref 26–34)
MCHC RBC AUTO-ENTMCNC: 31.7 G/DL (ref 30–36.5)
MCV RBC AUTO: 92.3 FL (ref 80–99)
NRBC # BLD: 0 K/UL (ref 0–0.01)
NRBC BLD-RTO: 0 PER 100 WBC
PLATELET # BLD AUTO: 229 K/UL (ref 150–400)
PMV BLD AUTO: 11.4 FL (ref 8.9–12.9)
RBC # BLD AUTO: 3.9 M/UL (ref 3.8–5.2)
RPR SER QL: NONREACTIVE
RUBV IGG SER-IMP: REACTIVE
RUBV IGG SERPL IA-ACNC: >500 IU/ML
WBC # BLD AUTO: 7.1 K/UL (ref 3.6–11)

## 2022-03-17 ENCOUNTER — HOSPITAL ENCOUNTER (OUTPATIENT)
Dept: PERINATAL CARE | Age: 32
Discharge: HOME | End: 2022-03-17
Attending: OBSTETRICS & GYNECOLOGY
Payer: COMMERCIAL

## 2022-03-17 PROCEDURE — 76811 OB US DETAILED SNGL FETUS: CPT | Performed by: OBSTETRICS & GYNECOLOGY

## 2022-03-18 ENCOUNTER — ROUTINE PRENATAL (OUTPATIENT)
Dept: OBGYN CLINIC | Age: 32
End: 2022-03-18
Payer: COMMERCIAL

## 2022-03-18 VITALS — SYSTOLIC BLOOD PRESSURE: 110 MMHG | BODY MASS INDEX: 31.07 KG/M2 | WEIGHT: 181 LBS | DIASTOLIC BLOOD PRESSURE: 66 MMHG

## 2022-03-18 DIAGNOSIS — Z23 ENCOUNTER FOR IMMUNIZATION: ICD-10-CM

## 2022-03-18 DIAGNOSIS — Z34.03 ENCOUNTER FOR SUPERVISION OF NORMAL FIRST PREGNANCY IN THIRD TRIMESTER: Primary | ICD-10-CM

## 2022-03-18 PROBLEM — Z34.00 ENCOUNTER FOR SUPERVISION OF NORMAL FIRST PREGNANCY, UNSPECIFIED TRIMESTER: Status: ACTIVE | Noted: 2021-12-03

## 2022-03-18 LAB
ERYTHROCYTE [DISTWIDTH] IN BLOOD BY AUTOMATED COUNT: 12.9 % (ref 11.5–14.5)
GLUCOSE 1H P 100 G GLC PO SERPL-MCNC: 114 MG/DL (ref 65–140)
HCT VFR BLD AUTO: 35 % (ref 35–47)
HGB BLD-MCNC: 11.1 G/DL (ref 11.5–16)
MCH RBC QN AUTO: 28.9 PG (ref 26–34)
MCHC RBC AUTO-ENTMCNC: 31.7 G/DL (ref 30–36.5)
MCV RBC AUTO: 91.1 FL (ref 80–99)
NRBC # BLD: 0 K/UL (ref 0–0.01)
NRBC BLD-RTO: 0 PER 100 WBC
PLATELET # BLD AUTO: 199 K/UL (ref 150–400)
PMV BLD AUTO: 11.5 FL (ref 8.9–12.9)
RBC # BLD AUTO: 3.84 M/UL (ref 3.8–5.2)
WBC # BLD AUTO: 7.1 K/UL (ref 3.6–11)

## 2022-03-18 PROCEDURE — 90471 IMMUNIZATION ADMIN: CPT | Performed by: OBSTETRICS & GYNECOLOGY

## 2022-03-18 PROCEDURE — 0502F SUBSEQUENT PRENATAL CARE: CPT | Performed by: OBSTETRICS & GYNECOLOGY

## 2022-03-18 PROCEDURE — 90715 TDAP VACCINE 7 YRS/> IM: CPT | Performed by: OBSTETRICS & GYNECOLOGY

## 2022-03-18 NOTE — PROGRESS NOTES
US at [facility] looks fine. Has follow up scheduled. Labs and TDAP today.

## 2022-03-18 NOTE — PROGRESS NOTES
Administered 0.5mL TETANUS, DIPHTHERIA TOXOIDS AND ACELLULAR PERTUSSIS VACCINE (TDAP) per MD order. Patient consent signed by patient. Injection given IM in the right deltoid . Patient tolerated well, no complications, no side effects.     Lot # [id number]  Exp: 11/23/23

## 2022-03-18 NOTE — PATIENT INSTRUCTIONS
Weeks 26 to 30 of Your Pregnancy: Care Instructions  Overview     You are now entering your last trimester of pregnancy. Your baby is growing quickly. Gela Donato probably feel your baby moving around more often. Your doctor may ask you to count your baby's kicks. Your back may ache as your body gets used to your baby's size and length. If you haven't already had the Tdap shot during this pregnancy, talk to your doctor about getting it. It will help protect your  against pertussis infection. During this time, it's important to take care of yourself and pay attention to what your body needs. If you feel sexual, you can explore ways to be close with your partner that match your comfort and desire. Follow-up care is a key part of your treatment and safety. Be sure to make and go to all appointments, and call your doctor if you are having problems. It's also a good idea to know your test results and keep a list of the medicines you take. How can you care for yourself at home? Take it easy at work  · Take frequent breaks. If possible, stop working when you are tired, and rest during your lunch hour. · Take bathroom breaks every 2 hours. · Change positions often. If you sit for long periods, stand up and walk around. · When you stand for a long time, keep one foot on a low stool with your knee bent. After standing a lot, sit with your feet up. · Avoid fumes, chemicals, and tobacco smoke. Be sexual in your own way  · Having sex during pregnancy is okay, unless your doctor tells you not to. · You may be very interested in sex, or you may have no interest at all. · Your growing belly can make it hard to find a good position during intercourse. [place] and explore. · You may get cramps in your uterus when your partner touches your breasts. · A back rub may relieve the backache or cramps that sometimes follow orgasm. Learn about  labor  · Watch for signs of  labor.  You may be going into labor if:  ? You have menstrual-like cramps, with or without nausea. ? You have about 6 or more contractions in 1 hour, even after you have had a glass of water and are resting. ? You have a low, dull backache that does not go away when you change your position. ? You have pain or pressure in your pelvis that comes and goes in a pattern. ? You have intestinal cramping or flu-like symptoms, with or without diarrhea.  ? You notice an increase or change in your vaginal discharge. Discharge may be heavy, mucus-like, watery, or streaked with blood. ? Your water breaks. · If you think you have  labor:  ? Drink 2 or 3 glasses of water or juice. Not drinking enough fluids can cause contractions. ? Stop what you are doing, and empty your bladder. Then lie down on your left side for at least 1 hour. ? While lying on your side, find your breast bone. Put your fingers in the soft spot just below it. Move your fingers down toward your belly button to find the top of your uterus. Check to see if it is tight. ? Contractions can be weak or strong. Record your contractions for an hour. Time a contraction from the start of one contraction to the start of the next one.  ? Single or several strong contractions without a pattern are called [place]-Joe contractions. They are practice contractions but not the start of labor. They often stop if you change what you are doing. ? Call your doctor if you have regular contractions. Where can you learn more? Go to http://www.gray.com/  Enter C847 in the search box to learn more about \"Weeks 26 to 30 of Your Pregnancy: Care Instructions. \"  Current as of: 2021               Content Version: 13.2  © 7491-4695 [employer]. Care instructions adapted under license by [employer] (which disclaims liability or warranty for this information).  If you have questions about a medical condition or this instruction, always ask your healthcare professional. [address] any warranty or liability for your use of this information. Vaccine Information Statement    Tdap (Tetanus, Diphtheria, Pertussis) Vaccine: What You Need to Know     Many vaccine information statements are available in [place] and other languages. See www.immunize.org/vis. Hojas de información sobre vacunas están disponibles en español y en muchos otros idiomas. Visite www.immunize.org/vis. 1. Why get vaccinated? Tdap vaccine can prevent tetanus, diphtheria, and pertussis. Diphtheria and pertussis spread from person to person. Tetanus enters the body through cuts or wounds.  TETANUS (T) causes painful stiffening of the muscles. Tetanus can lead to serious health problems, including being unable to open the mouth, having trouble swallowing and breathing, or death.  DIPHTHERIA (D) can lead to difficulty breathing, heart failure, paralysis, or death.  PERTUSSIS (aP), also known as whooping cough, can cause uncontrollable, violent coughing that makes it hard to breathe, eat, or drink. Pertussis can be extremely serious especially in babies and young children, causing pneumonia, convulsions, brain damage, or death. In teens and adults, it can cause weight loss, loss of bladder control, passing out, and rib fractures from severe coughing. 2. Tdap vaccine     Tdap is only for children 7 years and older, adolescents, and adults. Adolescents should receive a single dose of Tdap, preferably at age 6 or 15 years. Pregnant people should get a dose of Tdap during every pregnancy, preferably during the early part of the third trimester, to help protect the  from pertussis. Infants are most at risk for severe, life-threatening complications from pertussis. Adults who have never received Tdap should get a dose of Tdap.       Also, adults should receive a booster dose of either Tdap or Td (a different vaccine that protects against tetanus and diphtheria but not pertussis) every 10 years, or after 5 years in the case of a severe or dirty wound or burn. Tdap may be given at the same time as other vaccines. 3. Talk with your health care provider    Tell your vaccination provider if the person getting the vaccine:   Has had an allergic reaction after a previous dose of any vaccine that protects against tetanus, diphtheria, or pertussis, or has any severe, life-threatening allergies    Has had a coma, decreased level of consciousness, or prolonged seizures within 7 days after a previous dose of any pertussis vaccine (DTP, DTaP, or Tdap)   Has seizures or another nervous system problem   Has ever had Guillain-Barré Syndrome (also called GBS)   Has had severe pain or swelling after a previous dose of any vaccine that protects against tetanus or diphtheria    In some cases, your health care provider may decide to postpone Tdap vaccination until a future visit. People with minor illnesses, such as a cold, may be vaccinated. People who are moderately or severely ill should usually wait until they recover before getting Tdap vaccine. Your health care provider can give you more information. 4. Risks of a vaccine reaction     Pain, redness, or swelling where the shot was given, mild fever, headache, feeling tired, and nausea, vomiting, diarrhea, or stomachache sometimes happen after Tdap vaccination. People sometimes faint after medical procedures, including vaccination. Tell your provider if you feel dizzy or have vision changes or ringing in the ears. As with any medicine, there is a very remote chance of a vaccine causing a severe allergic reaction, other serious injury, or death. 5. What if there is a serious problem? An allergic reaction could occur after the vaccinated person leaves the clinic.  If you see signs of a severe allergic reaction (hives, swelling of the face and throat, difficulty breathing, a fast heartbeat, dizziness, or weakness), call 9-1-1 and get the person to the nearest hospital.    For other signs that concern you, call your health care provider. Adverse reactions should be reported to the Vaccine Adverse Event Reporting System (VAERS). Your health care provider will usually file this report, or you can do it yourself. Visit the VAERS website at www.vaers. [facility].gov or call 3-165.542.5545. VAERS is only for reporting reactions, and VAERS staff members do not give medical advice. 6. The National Vaccine Injury Compensation Program    The [facility] Vaccine Injury Compensation Program (VICP) is a federal program that was created to compensate people who may have been injured by certain vaccines. Claims regarding alleged injury or death due to vaccination have a time limit for filing, which may be as short as two years. Visit the VICP website at www.[facility].gov/vaccinecompensation or call 9-331.127.8901 to learn about the program and about filing a claim. 7. How can I learn more?  Ask your health care provider.  Call your local or state health department.  Visit the website of the Food and Drug Administration (FDA) for vaccine package inserts and additional information at www.fda.gov/vaccines-blood-biologics/vaccines.  Contact the Centers for Disease Control and Prevention (CDC):  - Call 9-551.379.7083 (1-800-CDC-INFO) or  - Visit CDCs website at www.cdc.gov/vaccines. Vaccine Information Statement   Tdap (Tetanus, Diphtheria, Pertussis) Vaccine  8/6/2021  42 JOEY Blood [id number]   Department of Health and Human Services  Centers for Disease Control and Prevention    Office Use Only

## 2022-03-18 NOTE — LETTER
3/18/2022 9:06 AM    Ms. Daniel Sierra [id number]      To Whom It May Concern: The above patient is currently under our care during her pregnancy. Due to her pregnancy, we are asking that she work from home starting Monday March 21, 2022    If you have any questions please let us know.           Sincerely,      Blaze Mcdermott MD

## 2022-04-14 ENCOUNTER — HOSPITAL ENCOUNTER (OUTPATIENT)
Dept: PERINATAL CARE | Age: 32
Discharge: HOME | End: 2022-04-14
Attending: OBSTETRICS & GYNECOLOGY
Payer: COMMERCIAL

## 2022-04-14 PROCEDURE — 76816 OB US FOLLOW-UP PER FETUS: CPT | Performed by: OBSTETRICS & GYNECOLOGY

## 2022-04-15 ENCOUNTER — ROUTINE PRENATAL (OUTPATIENT)
Dept: OBGYN CLINIC | Age: 32
End: 2022-04-15
Payer: COMMERCIAL

## 2022-04-15 VITALS — BODY MASS INDEX: 32.03 KG/M2 | WEIGHT: 186.6 LBS | SYSTOLIC BLOOD PRESSURE: 118 MMHG | DIASTOLIC BLOOD PRESSURE: 60 MMHG

## 2022-04-15 DIAGNOSIS — Z34.03 ENCOUNTER FOR SUPERVISION OF NORMAL FIRST PREGNANCY IN THIRD TRIMESTER: Primary | ICD-10-CM

## 2022-04-15 PROCEDURE — 0502F SUBSEQUENT PRENATAL CARE: CPT | Performed by: OBSTETRICS & GYNECOLOGY

## 2022-04-29 ENCOUNTER — ROUTINE PRENATAL (OUTPATIENT)
Dept: OBGYN CLINIC | Age: 32
End: 2022-04-29
Payer: COMMERCIAL

## 2022-04-29 VITALS — BODY MASS INDEX: 32.58 KG/M2 | WEIGHT: 189.8 LBS | DIASTOLIC BLOOD PRESSURE: 66 MMHG | SYSTOLIC BLOOD PRESSURE: 120 MMHG

## 2022-04-29 DIAGNOSIS — Z34.03 ENCOUNTER FOR SUPERVISION OF NORMAL FIRST PREGNANCY IN THIRD TRIMESTER: Primary | ICD-10-CM

## 2022-04-29 PROCEDURE — 0502F SUBSEQUENT PRENATAL CARE: CPT | Performed by: OBSTETRICS & GYNECOLOGY

## 2022-05-12 ENCOUNTER — ROUTINE PRENATAL (OUTPATIENT)
Dept: OBGYN CLINIC | Age: 32
End: 2022-05-12
Payer: COMMERCIAL

## 2022-05-12 ENCOUNTER — HOSPITAL ENCOUNTER (OUTPATIENT)
Dept: PERINATAL CARE | Age: 32
Discharge: HOME | End: 2022-05-12
Attending: OBSTETRICS & GYNECOLOGY
Payer: COMMERCIAL

## 2022-05-12 VITALS — BODY MASS INDEX: 32.44 KG/M2 | SYSTOLIC BLOOD PRESSURE: 140 MMHG | DIASTOLIC BLOOD PRESSURE: 76 MMHG | WEIGHT: 189 LBS

## 2022-05-12 DIAGNOSIS — Z34.03 ENCOUNTER FOR SUPERVISION OF NORMAL FIRST PREGNANCY IN THIRD TRIMESTER: Primary | ICD-10-CM

## 2022-05-12 LAB — GRBS, EXTERNAL: NEGATIVE

## 2022-05-12 PROCEDURE — 76819 FETAL BIOPHYS PROFIL W/O NST: CPT | Performed by: OBSTETRICS & GYNECOLOGY

## 2022-05-12 PROCEDURE — 76816 OB US FOLLOW-UP PER FETUS: CPT | Performed by: OBSTETRICS & GYNECOLOGY

## 2022-05-12 PROCEDURE — 0502F SUBSEQUENT PRENATAL CARE: CPT | Performed by: OBSTETRICS & GYNECOLOGY

## 2022-05-16 LAB
BACTERIA SPEC CULT: NORMAL
SERVICE CMNT-IMP: NORMAL

## 2022-05-19 ENCOUNTER — ROUTINE PRENATAL (OUTPATIENT)
Dept: OBGYN CLINIC | Age: 32
End: 2022-05-19
Payer: COMMERCIAL

## 2022-05-19 VITALS — SYSTOLIC BLOOD PRESSURE: 128 MMHG | WEIGHT: 190 LBS | BODY MASS INDEX: 32.61 KG/M2 | DIASTOLIC BLOOD PRESSURE: 80 MMHG

## 2022-05-19 DIAGNOSIS — Z3A.37 PREGNANCY WITH 37 WEEKS COMPLETED GESTATION: Primary | ICD-10-CM

## 2022-05-19 PROCEDURE — 0502F SUBSEQUENT PRENATAL CARE: CPT | Performed by: OBSTETRICS & GYNECOLOGY

## 2022-05-19 NOTE — PROGRESS NOTES
Doing well. Fetus active. No Reg Ucs. Some [employer] [facility]. Cx is TFT/60.   GBS neg

## 2022-05-26 ENCOUNTER — ROUTINE PRENATAL (OUTPATIENT)
Dept: OBGYN CLINIC | Age: 32
End: 2022-05-26
Payer: COMMERCIAL

## 2022-05-26 VITALS — BODY MASS INDEX: 32.96 KG/M2 | SYSTOLIC BLOOD PRESSURE: 139 MMHG | WEIGHT: 192 LBS | DIASTOLIC BLOOD PRESSURE: 83 MMHG

## 2022-05-26 DIAGNOSIS — Z34.03 ENCOUNTER FOR SUPERVISION OF NORMAL FIRST PREGNANCY IN THIRD TRIMESTER: Primary | ICD-10-CM

## 2022-05-26 PROCEDURE — 0502F SUBSEQUENT PRENATAL CARE: CPT | Performed by: OBSTETRICS & GYNECOLOGY

## 2022-06-02 ENCOUNTER — ROUTINE PRENATAL (OUTPATIENT)
Dept: OBGYN CLINIC | Age: 32
End: 2022-06-02
Payer: COMMERCIAL

## 2022-06-02 VITALS — SYSTOLIC BLOOD PRESSURE: 132 MMHG | DIASTOLIC BLOOD PRESSURE: 81 MMHG | WEIGHT: 196.4 LBS | BODY MASS INDEX: 33.71 KG/M2

## 2022-06-02 DIAGNOSIS — Z34.03 ENCOUNTER FOR SUPERVISION OF NORMAL FIRST PREGNANCY IN THIRD TRIMESTER: Primary | ICD-10-CM

## 2022-06-02 PROCEDURE — 0502F SUBSEQUENT PRENATAL CARE: CPT | Performed by: OBSTETRICS & GYNECOLOGY

## 2022-06-07 ENCOUNTER — HOSPITAL ENCOUNTER (INPATIENT)
Age: 32
LOS: 4 days | Discharge: HOME | End: 2022-06-11
Attending: OBSTETRICS & GYNECOLOGY | Admitting: OBSTETRICS & GYNECOLOGY
Payer: COMMERCIAL

## 2022-06-07 ENCOUNTER — ROUTINE PRENATAL (OUTPATIENT)
Dept: OBGYN CLINIC | Age: 32
End: 2022-06-07
Payer: COMMERCIAL

## 2022-06-07 VITALS — BODY MASS INDEX: 33.61 KG/M2 | WEIGHT: 195.8 LBS | SYSTOLIC BLOOD PRESSURE: 138 MMHG | DIASTOLIC BLOOD PRESSURE: 86 MMHG

## 2022-06-07 DIAGNOSIS — Z34.03 ENCOUNTER FOR SUPERVISION OF NORMAL FIRST PREGNANCY IN THIRD TRIMESTER: Primary | ICD-10-CM

## 2022-06-07 DIAGNOSIS — R52 POSTPARTUM PAIN: Primary | ICD-10-CM

## 2022-06-07 PROBLEM — Z34.90 ENCOUNTER FOR INDUCTION OF LABOR: Status: ACTIVE | Noted: 2022-06-07

## 2022-06-07 LAB
ABO + RH BLD: NORMAL
BASOPHILS # BLD: 0 K/UL (ref 0–0.1)
BASOPHILS NFR BLD: 0 % (ref 0–1)
BLOOD GROUP ANTIBODIES SERPL: NORMAL
DIFFERENTIAL METHOD BLD: ABNORMAL
EOSINOPHIL # BLD: 0 K/UL (ref 0–0.4)
EOSINOPHIL NFR BLD: 1 % (ref 0–7)
ERYTHROCYTE [DISTWIDTH] IN BLOOD BY AUTOMATED COUNT: 13.6 % (ref 11.5–14.5)
HCT VFR BLD AUTO: 32.5 % (ref 35–47)
HGB BLD-MCNC: 10.5 G/DL (ref 11.5–16)
IMM GRANULOCYTES # BLD AUTO: 0 K/UL (ref 0–0.04)
IMM GRANULOCYTES NFR BLD AUTO: 1 % (ref 0–0.5)
LYMPHOCYTES # BLD: 1.5 K/UL (ref 0.8–3.5)
LYMPHOCYTES NFR BLD: 18 % (ref 12–49)
MCH RBC QN AUTO: 26.1 PG (ref 26–34)
MCHC RBC AUTO-ENTMCNC: 32.3 G/DL (ref 30–36.5)
MCV RBC AUTO: 80.8 FL (ref 80–99)
MONOCYTES # BLD: 0.5 K/UL (ref 0–1)
MONOCYTES NFR BLD: 6 % (ref 5–13)
NEUTS SEG # BLD: 6.4 K/UL (ref 1.8–8)
NEUTS SEG NFR BLD: 75 % (ref 32–75)
NRBC # BLD: 0 K/UL (ref 0–0.01)
NRBC BLD-RTO: 0 PER 100 WBC
PLATELET # BLD AUTO: 182 K/UL (ref 150–400)
RBC # BLD AUTO: 4.02 M/UL (ref 3.8–5.2)
SPECIMEN EXP DATE BLD: NORMAL
WBC # BLD AUTO: 8.6 K/UL (ref 3.6–11)

## 2022-06-07 PROCEDURE — 74011250636 HC RX REV CODE- 250/636: Performed by: NURSE PRACTITIONER

## 2022-06-07 PROCEDURE — 74011250637 HC RX REV CODE- 250/637: Performed by: NURSE PRACTITIONER

## 2022-06-07 PROCEDURE — 65270000029 HC RM PRIVATE

## 2022-06-07 PROCEDURE — 85025 COMPLETE CBC W/AUTO DIFF WBC: CPT

## 2022-06-07 PROCEDURE — 36415 COLL VENOUS BLD VENIPUNCTURE: CPT

## 2022-06-07 PROCEDURE — 0502F SUBSEQUENT PRENATAL CARE: CPT | Performed by: OBSTETRICS & GYNECOLOGY

## 2022-06-07 PROCEDURE — 59200 INSERT CERVICAL DILATOR: CPT | Performed by: OBSTETRICS & GYNECOLOGY

## 2022-06-07 PROCEDURE — 86900 BLOOD TYPING SEROLOGIC ABO: CPT

## 2022-06-07 PROCEDURE — 75410000002 HC LABOR FEE PER 1 HR: Performed by: OBSTETRICS & GYNECOLOGY

## 2022-06-07 RX ORDER — SODIUM CHLORIDE 0.9 % (FLUSH) 0.9 %
5-40 SYRINGE (ML) INJECTION EVERY 8 HOURS
Status: DISCONTINUED | OUTPATIENT
Start: 2022-06-07 | End: 2022-06-11 | Stop reason: HOSPADM

## 2022-06-07 RX ORDER — OXYTOCIN/RINGER'S LACTATE 30/500 ML
0-20 PLASTIC BAG, INJECTION (ML) INTRAVENOUS
Status: DISCONTINUED | OUTPATIENT
Start: 2022-06-08 | End: 2022-06-11 | Stop reason: HOSPADM

## 2022-06-07 RX ORDER — OMEPRAZOLE 20 MG/1
20 CAPSULE, DELAYED RELEASE ORAL DAILY
COMMUNITY
End: 2022-06-11

## 2022-06-07 RX ORDER — OXYTOCIN/RINGER'S LACTATE 30/500 ML
10 PLASTIC BAG, INJECTION (ML) INTRAVENOUS AS NEEDED
Status: DISCONTINUED | OUTPATIENT
Start: 2022-06-07 | End: 2022-06-11 | Stop reason: HOSPADM

## 2022-06-07 RX ORDER — OXYTOCIN/RINGER'S LACTATE 30/500 ML
87.3 PLASTIC BAG, INJECTION (ML) INTRAVENOUS AS NEEDED
Status: DISCONTINUED | OUTPATIENT
Start: 2022-06-07 | End: 2022-06-11 | Stop reason: HOSPADM

## 2022-06-07 RX ORDER — SODIUM CHLORIDE, SODIUM LACTATE, POTASSIUM CHLORIDE, CALCIUM CHLORIDE 600; 310; 30; 20 MG/100ML; MG/100ML; MG/100ML; MG/100ML
125 INJECTION, SOLUTION INTRAVENOUS CONTINUOUS
Status: DISCONTINUED | OUTPATIENT
Start: 2022-06-07 | End: 2022-06-11 | Stop reason: HOSPADM

## 2022-06-07 RX ORDER — MAG HYDROX/ALUMINUM HYD/SIMETH 200-200-20
30 SUSPENSION, ORAL (FINAL DOSE FORM) ORAL
Status: DISCONTINUED | OUTPATIENT
Start: 2022-06-07 | End: 2022-06-09 | Stop reason: HOSPADM

## 2022-06-07 RX ORDER — SODIUM CHLORIDE 0.9 % (FLUSH) 0.9 %
5-40 SYRINGE (ML) INJECTION AS NEEDED
Status: DISCONTINUED | OUTPATIENT
Start: 2022-06-07 | End: 2022-06-11 | Stop reason: HOSPADM

## 2022-06-07 RX ORDER — ONDANSETRON 2 MG/ML
4 INJECTION INTRAMUSCULAR; INTRAVENOUS
Status: DISCONTINUED | OUTPATIENT
Start: 2022-06-07 | End: 2022-06-09 | Stop reason: HOSPADM

## 2022-06-07 RX ORDER — NALBUPHINE HYDROCHLORIDE 10 MG/ML
10 INJECTION, SOLUTION INTRAMUSCULAR; INTRAVENOUS; SUBCUTANEOUS
Status: DISCONTINUED | OUTPATIENT
Start: 2022-06-07 | End: 2022-06-11 | Stop reason: HOSPADM

## 2022-06-07 RX ORDER — NALOXONE HYDROCHLORIDE 0.4 MG/ML
0.4 INJECTION, SOLUTION INTRAMUSCULAR; INTRAVENOUS; SUBCUTANEOUS AS NEEDED
Status: DISCONTINUED | OUTPATIENT
Start: 2022-06-07 | End: 2022-06-09 | Stop reason: HOSPADM

## 2022-06-07 RX ORDER — LIDOCAINE HYDROCHLORIDE 10 MG/ML
10 INJECTION INFILTRATION; PERINEURAL AS NEEDED
Status: DISCONTINUED | OUTPATIENT
Start: 2022-06-07 | End: 2022-06-09 | Stop reason: HOSPADM

## 2022-06-07 RX ORDER — DIPHENHYDRAMINE HCL 25 MG
50 CAPSULE ORAL
Status: DISCONTINUED | OUTPATIENT
Start: 2022-06-07 | End: 2022-06-09

## 2022-06-07 RX ADMIN — DIPHENHYDRAMINE HYDROCHLORIDE 50 MG: 25 CAPSULE ORAL at 22:41

## 2022-06-07 RX ADMIN — NALBUPHINE HYDROCHLORIDE 10 MG: 10 INJECTION, SOLUTION INTRAMUSCULAR; INTRAVENOUS; SUBCUTANEOUS at 22:42

## 2022-06-07 NOTE — PROGRESS NOTES
[facility]  OFFICE PROCEDURE PROGRESS NOTE        Chart reviewed for the following:   Felipe GUILLAUME Shown, have reviewed the History, Physical and updated the Allergic reactions for [address] performed immediately prior to start of procedure:   Felipe GUILLAUME Shown, have performed the following reviews on Gerardo Lundy prior to the start of the procedure:            * Patient was identified by name and date of birth   * Agreement on procedure being performed was verified  * Risks and Benefits explained to the patient  * Procedure site verified and marked as necessary  * Patient was positioned for comfort  * Consent was signed and verified     Time: 11:40am      Date of procedure: 2022    Procedure performed by: Thais Klinefelter, MD    Provider assisted by: Juan Luis Mcmullen. Susan MONTERROSO    Patient assisted by: self    How tolerated by patient: tolerated the procedure well with no complications    Post Procedural Pain Scale: 0 - No Hurt    Comments: none    Cervical Catheter insertion procedure note    Gerardo Lundy is a ,  29 y.o. female who presents today far placement of a cervical catheter in the cervix for ripening. Her cervix is unfavorable and she is scheduled for induction tomorrow. She has elected to have a cervical catheter placement today. The risks, benefits and assets of the procedure were discussed. Her questions were answered. PROCEDURE: The vagina and cervix was prepped with Zephiran. A Cook catheter was placed through the cervix without difficulty. The uterine bulb was inflated with 60 cc of saline. The cervical balloon was inflated to 40 cc of saline. Bleeding was none. The patient's level of discomfort was minimal.   POST PROCEDURE: The patient tolerated the procedure well. There were no complications. She is to return after dinner to be admitted as an outpatient for monitoring overnight. She was given labor and ROM warnings.

## 2022-06-08 ENCOUNTER — ANESTHESIA (OUTPATIENT)
Dept: LABOR AND DELIVERY | Age: 32
End: 2022-06-08
Payer: COMMERCIAL

## 2022-06-08 ENCOUNTER — ANESTHESIA EVENT (OUTPATIENT)
Dept: LABOR AND DELIVERY | Age: 32
End: 2022-06-08
Payer: COMMERCIAL

## 2022-06-08 PROCEDURE — 3E033VJ INTRODUCTION OF OTHER HORMONE INTO PERIPHERAL VEIN, PERCUTANEOUS APPROACH: ICD-10-PCS | Performed by: OBSTETRICS & GYNECOLOGY

## 2022-06-08 PROCEDURE — 75410000002 HC LABOR FEE PER 1 HR: Performed by: OBSTETRICS & GYNECOLOGY

## 2022-06-08 PROCEDURE — 10907ZC DRAINAGE OF AMNIOTIC FLUID, THERAPEUTIC FROM PRODUCTS OF CONCEPTION, VIA NATURAL OR ARTIFICIAL OPENING: ICD-10-PCS | Performed by: OBSTETRICS & GYNECOLOGY

## 2022-06-08 PROCEDURE — 74011000250 HC RX REV CODE- 250: Performed by: STUDENT IN AN ORGANIZED HEALTH CARE EDUCATION/TRAINING PROGRAM

## 2022-06-08 PROCEDURE — 65270000029 HC RM PRIVATE

## 2022-06-08 PROCEDURE — 59200 INSERT CERVICAL DILATOR: CPT | Performed by: OBSTETRICS & GYNECOLOGY

## 2022-06-08 PROCEDURE — 74011250636 HC RX REV CODE- 250/636: Performed by: NURSE PRACTITIONER

## 2022-06-08 RX ORDER — LIDOCAINE HYDROCHLORIDE AND EPINEPHRINE 15; 5 MG/ML; UG/ML
INJECTION, SOLUTION EPIDURAL
Status: SHIPPED | OUTPATIENT
Start: 2022-06-08 | End: 2022-06-08

## 2022-06-08 RX ORDER — FENTANYL/BUPIVACAINE/NS/PF 2-1250MCG
1-16 PREFILLED PUMP RESERVOIR EPIDURAL CONTINUOUS
Status: DISCONTINUED | OUTPATIENT
Start: 2022-06-08 | End: 2022-06-09

## 2022-06-08 RX ORDER — NALOXONE HYDROCHLORIDE 0.4 MG/ML
0.4 INJECTION, SOLUTION INTRAMUSCULAR; INTRAVENOUS; SUBCUTANEOUS ONCE
Status: ACTIVE | OUTPATIENT
Start: 2022-06-08 | End: 2022-06-08

## 2022-06-08 RX ORDER — EPHEDRINE SULFATE/0.9% NACL/PF 50 MG/5 ML
10 SYRINGE (ML) INTRAVENOUS
Status: DISCONTINUED | OUTPATIENT
Start: 2022-06-08 | End: 2022-06-09

## 2022-06-08 RX ADMIN — LIDOCAINE HYDROCHLORIDE AND EPINEPHRINE 4.5 ML: 15; 5 INJECTION, SOLUTION EPIDURAL at 09:55

## 2022-06-08 RX ADMIN — Medication 12 ML/HR: at 23:25

## 2022-06-08 RX ADMIN — Medication 12 ML/HR: at 17:19

## 2022-06-08 RX ADMIN — OXYTOCIN 2 MILLI-UNITS/MIN: 10 INJECTION, SOLUTION INTRAMUSCULAR; INTRAVENOUS at 06:06

## 2022-06-08 RX ADMIN — SODIUM CHLORIDE, POTASSIUM CHLORIDE, SODIUM LACTATE AND CALCIUM CHLORIDE 125 ML/HR: 600; 310; 30; 20 INJECTION, SOLUTION INTRAVENOUS at 06:00

## 2022-06-08 RX ADMIN — OXYTOCIN 2 MILLI-UNITS/MIN: 10 INJECTION, SOLUTION INTRAMUSCULAR; INTRAVENOUS at 11:35

## 2022-06-08 RX ADMIN — Medication 12 ML/HR: at 10:32

## 2022-06-08 NOTE — PROGRESS NOTES
NUTRITION    Best practice alert was triggered based on results obtained during nursing admission assessment for Unsure wt loss and pregnant (not in labor). The patient's chart was reviewed and nutrition assessment is not indicated at this time. Plan to see patient for rescreen as indicated. Thank you.      Last 3 Recorded Weights in this Encounter    06/07/22 2226   Weight: 88 kg (194 lb)     Wt Readings from Last 6 Encounters:   06/07/22 88 kg (194 lb)   06/07/22 88.8 kg (195 lb 12.8 oz)   06/02/22 89.1 kg (196 lb 6.4 oz)   05/26/22 87.1 kg (192 lb)   05/19/22 86.2 kg (190 lb)   05/12/22 85.7 kg (189 lb)     Fiordaliza Benjamin RD, MS  Contact via [employer] or office [phone]

## 2022-06-08 NOTE — ANESTHESIA PROCEDURE NOTES
Epidural Block    Patient location during procedure: OB  Start time: 6/8/2022 9:55 AM  End time: 6/8/2022 10:15 AM  Reason for block: labor epidural  Staffing  Performed: attending   Anesthesiologist: Nicole Moscoso DO  Preanesthetic Checklist  Completed: patient identified, IV checked, site marked, risks and benefits discussed, surgical consent, monitors and equipment checked, pre-op evaluation and timeout performed  Block Placement  Patient position: sitting  Prep: ChloraPrep  Sterility prep: cap, drape and gloves  Sedation level: no sedation  Patient monitoring: frequent blood pressure checks and heart rate  Approach: midline  Location: lumbar  Lumbar location: L2-L3  Epidural  Loss of resistance technique: saline  Guidance: landmark technique  Needle  Needle type: Tuohy   Needle gauge: 17 G  Needle length: 10 cm  Needle insertion depth: 6 cm  Catheter size: 20 G  Catheter at skin depth: 12 cm  Catheter securement method: clear occlusive dressing, liquid medical adhesive and surgical tape  Test dose: negative  Medications Administered  Lidocaine-EPINEPHrine (XYLOCAINE) 1.5 %-1:200,000 Epidural, 4.5 mL  Assessment  Block outcome: pain improved  Number of attempts: 1  Procedure assessment: patient tolerated procedure well with no immediate complications

## 2022-06-08 NOTE — PROGRESS NOTES
Ob Progress Note    Subjective: Slept well    Objective:   Patient Vitals for the past 4 hrs:   Temp Pulse Resp BP SpO2   06/08/22 0730 -- -- -- -- 95 %   06/08/22 0725 -- -- -- -- 97 %   06/08/22 0724 -- 79 -- (!) 149/80 --   06/08/22 0722 -- 83 -- (!) 142/96 --   06/08/22 0709 -- 86 -- (!) 148/88 97 %   06/08/22 0704 -- -- -- -- 97 %   06/08/22 0659 -- -- -- -- 98 %   06/08/22 0651 -- -- -- -- 99 %   06/08/22 0650 98 °F (36.7 °C) 83 16 (!) 146/90 97 %   06/08/22 0646 -- -- -- -- 97 %   06/08/22 0641 -- -- -- -- 98 %   06/08/22 0636 -- -- -- -- 97 %   06/08/22 0631 -- -- -- -- 98 %   06/08/22 0621 -- -- -- -- 97 %   06/08/22 0616 -- -- -- -- 96 %   06/08/22 0611 98.4 °F (36.9 °C) -- -- -- 95 %   06/08/22 0606 -- -- -- -- 98 %   06/08/22 0601 -- -- -- -- 98 %   06/08/22 0556 -- -- -- -- 98 %   06/08/22 0551 -- -- -- -- 98 %   06/08/22 0536 -- -- -- -- 97 %   06/08/22 0531 -- -- -- -- 97 %   06/08/22 0526 -- -- -- -- 97 %   06/08/22 0521 -- -- -- -- 96 %   06/08/22 0516 -- -- -- -- 97 %   06/08/22 0511 -- -- -- -- 97 %   06/08/22 0506 -- -- -- -- 97 %   06/08/22 0501 -- -- -- -- 98 %              FHT's category 1. Cervix:  5 cm  70%  -3  AROM clear fluid    Contractions: mild  every 6 minutes lasting 30 seconds. Assessment/Plan:  AROM done  Will increase pitocin, Epidural when ready.

## 2022-06-08 NOTE — ANESTHESIA PREPROCEDURE EVALUATION
Anesthetic History     PONV          Review of Systems / Medical History  Patient summary reviewed, nursing notes reviewed and pertinent labs reviewed    Pulmonary  Within defined limits            Pertinent negatives: No recent URI     Neuro/Psych   Within defined limits           Cardiovascular  Within defined limits                Exercise tolerance: >4 METS     GI/Hepatic/Renal  Within defined limits              Endo/Other  Within defined limits           Other Findings              Physical Exam    Airway  Mallampati: II  TM Distance: > 6 cm  Neck ROM: normal range of motion   Mouth opening: Normal     Cardiovascular  Regular rate and rhythm,  S1 and S2 normal,  no murmur, click, rub, or gallop             Dental    Dentition: Upper dentition intact and Lower dentition intact     Pulmonary  Breath sounds clear to auscultation               Abdominal  GI exam deferred       Other Findings            Anesthetic Plan    ASA: 2  Anesthesia type: epidural      Post-op pain plan if not by surgeon: indwelling epidural catheter      Anesthetic plan and risks discussed with: Patient

## 2022-06-08 NOTE — PROGRESS NOTES
6/8/2022  11:32 AM    CM met with MICK to complete initial assessment and begin discharge planning. MOB verified and confirmed demographics. MICK lives with Karin Backers ( [phone]),  at the address on file. MICK is employed and plans to take 6wks off from work. FOGIA is also employed and will be taking 3wks off. MICK reports she has good family support, and feels like she has the support she needs when she returns home. MICK plans to breast feed baby and has pump to use at home. Dr. Yazmin Alcaraz will provide follow up care for infant. MICK has car seat, bassinet/crib, clothing, bottles and all necessary supplies for baby. MICK has Ronny Sago and will be adding baby to this policy. CM discussed process to add baby to insurance, MOB verbalized understanding. MICK denied needing WIC/Medicaid services. Care Management Interventions  PCP Verified by CM: Yes Maci Enrique)  Mode of Transport at Discharge:  Other (see comment)  Transition of Care Consult (CM Consult): Discharge Planning  Support Systems: Other Family Member(s),Spouse/Significant Other  Confirm Follow Up Transport: Family  Discharge Location  Patient Expects to be Discharged to[de-identified] Home with family assistance  Derik Loredo

## 2022-06-08 NOTE — ROUTINE PROCESS
2200: Patient admission assessment completed, reviewed plan of car with patient   2240: requesting medication for pain and to help her sleep given now. 2345: rangel bulb removed at this time with gentle traction. 0200: Patient sleeping, no signs of distress present. 6092: Patient called out to report small amount of mucous and bloody discharge, reassured that this is normal and will likely continue to happen as she begins laboring.   0605: Pitocin started now. 0645: BP elevated - charge and oncoming RN aware. 0012: SBAR report given to [facility].

## 2022-06-08 NOTE — H&P
DANIEL History & Physical    Subjective: Ericka Orona is a 29 y.o. female  [address] @ 39w5d by Estimated Date of Delivery: 22 who arrives to L&D for an elective induction of labor. She endorses +FM, intact membranes; Pt denies vaginal bleeding, fever/chills, chest pain, SOB, HA, scotomata, sudden swelling, nor malaise. She reports a benign pregnancy course.      Pregnancy problems include:     -   Patient Active Problem List   Diagnosis Code    Abnormal uterine bleeding N93.9    Encounter for supervision of normal first pregnancy, unspecified trimester Z34.00    Encounter for induction of labor Z34.90       Allergies  - No Known Allergies    Prenatal Labs  Lab Results   Component Value Date/Time    GrNGOZItreyair, External Negative 2022 12:00 AM     A POSITIVE    OB History  OB History        1    Para   0    Term   0       0    AB   0    Living   0       SAB   0    IAB   0    Ectopic   0    Molar        Multiple   0    Live Births                     PMHx  Past Medical History:   Diagnosis Date    Chlamydia     HPV vaccine counseling     Gardasil, 3/3 injections    Pap smear for cervical cancer screening 2013    negative        PSHx  Past Surgical History:   Procedure Laterality Date    HX ENDOSCOPY  2017    HX GYN  2020    Hysterosalpingogram    HX HEENT      wisdom teeth removed    HX TONSILLECTOMY          F/SHx  Family History   Problem Relation Age of Onset    No Known Problems Mother     No Known Problems Father       Social History     Socioeconomic History    Marital status:      Spouse name: Not on file    Number of children: Not on file    Years of education: Not on file    Highest education level: Not on file   Occupational History    Not on file   Tobacco Use    Smoking status: Never Smoker    Smokeless tobacco: Never Used   Substance and Sexual Activity    Alcohol use: No     Alcohol/week: 0.0 standard drinks    Drug use: No    Sexual activity: Yes     Partners: Male     Birth control/protection: None   Other Topics Concern    Not on file   Social History Narrative    Not on file     Social Determinants of Health     Financial Resource Strain:     Difficulty of Paying Living Expenses: Not on file   Food Insecurity:     Worried About Running Out of Food in the Last Year: Not on file    Madhavi of Food in the Last Year: Not on file   Transportation Needs:     Lack of Transportation (Medical): Not on file    Lack of Transportation (Non-Medical): Not on file   Physical Activity:     Days of Exercise per Week: Not on file    Minutes of Exercise per Session: Not on file   Stress:     Feeling of Stress : Not on file   Social Connections:     Frequency of Communication with Friends and Family: Not on file    Frequency of Social Gatherings with Friends and Family: Not on file    Attends Tenriism Services: Not on file    Active Member of [address] [place] [address] [employer] or Organizations: Not on file    Attends Club or Organization Meetings: Not on file    Marital Status: Not on file   Intimate Partner Violence:     Fear of Current or Ex-Partner: Not on file    Emotionally Abused: Not on file    Physically Abused: Not on file    Sexually Abused: Not on file   Housing Stability:     Unable to Pay for Housing in the Last Year: Not on file    Number of [place] in the Last Year: Not on file    Unstable Housing in the Last Year: Not on file       Home Medications:  Prior to Admission Medications   Prescriptions Last Dose Informant Patient Reported? Taking? CALCIUM CARBONATE/VITAMIN D3 (CALCIUM + D PO) 6/7/2022 at Unknown time  Yes Yes   Sig: Take  by mouth. Chews one gummy po once daily. PNV Comb #2-Iron-Omega 3-FA [id number] mg cmpk 6/7/2022 at Unknown time  Yes Yes   Sig: Take 1 Tab by mouth daily. hydrocortisone (ANUSOL-HC) 2.5 % rectal cream Not Taking at Unknown time  No No   Sig: Insert  into rectum four (4) times daily.    Patient not taking: Reported on 2022      Facility-Administered Medications: None        Review of Systems:  A comprehensive review of systems was negative except for that written in the History of Present Illness. Objective:     Vitals:    22 2130 224   BP: (!) 140/90    Pulse: 78    Resp: 16    SpO2:  98%        Physical Exam:  General:  Alert, cooperative, no distress, appears stated age. Lungs:    Symmetrical expansion, non-labored   Heart:  Regular rate    GI/Abdomen:   Soft, non-tender. Gravid. Extremities: Extremities normal, atraumatic, no cyanosis or edema. Skin: Skin color, texture, turgor normal. No rashes or lesions   /Cervical Exam: no lesions or erythema  Cervix:  deferred, cook in place   Fetal Presentation: Vertex by Leopolds  Pelvimetry: Adequate  Fetal Size: AGA      Electronic Fetal Monitoring    Fetal Heart Rate:   Baseline 130, moderate variability, +accels, no decels, cat 1 FHT    Uterine Contractions: External, irritability noted, relaxed to palpation    Assessment:   29 y.o. female  [facility] @  39w5 d by [place] of Estimated Date of Delivery: 22  Admission diagnoses:   -encounter for elective induction of labor      Pregnancy problems include:   -   Patient Active Problem List   Diagnosis Code    Abnormal uterine bleeding N93.9    Encounter for supervision of normal first pregnancy, unspecified trimester Z34.00    Encounter for induction of labor Z34.90     - There is no height or weight on file to calculate BMI.     Pertinent Labs:  - A POSITIVE  -   Lab Results   Component Value Date/Time    GrBStrep, External Negative 2022 12:00 AM       EFM:   - Category 1  - NST: Fetal NST Findings: reactive with indication of establishing a baseline fetal evaluation conducted over a minimum of 20 minutes    Plan:   Admit to L&D for induction of labor     Tests/Labs/Monitoring ordered: Electronic Fetal Monitoring Intermittent EFM and per unit policy, SVE, CBC w/o DIF and Type & Screen  GBS negative    Review of prenatal records to include labs/diagnostic testing, encounter notes  Review and sign consents   Patient Education:   - Pain management, as indicated; R/B/SE of IV meds, nitrous oxide, and epidural discussed. - Plan of care- discussed removing cook at [phone] as it will have been 12 hours. Discussed addition       Of miso if desired with R/B/SE- patient undecided. Pitocin at 7am. Will consider [address] labs if another       Elevated blood pressure present. Patient has been counseled regarding this plan of care and is in agreement; all questions answered.     Collaborative MD: Dr. Alexia Robledo By:  Tomas Gaspar CNM      6/7/2022 9:54 PM

## 2022-06-08 NOTE — PROGRESS NOTES
8399 SBAR report received from Bj Esparza, RN. Care handed over at this time. 1688 Dr. Sabina Kennedy at bedside. SVE per MD, 5/70/-2.     0182 This RN at bedside. PT requesting to start bolus for epidural.     7768  This RN at bedside reducing pitocin. 4449 This RN at bedside reducing pitocin. 0930 Pitocin turned off.    0944 Anesthesia at bedside for epidural placement. Casey Diaz 29 for epidural.    1200 Dr. Sabina Kennedy at bedside. SVE per MD, 5/80/-2. MD placed IUPC at this time. MD discusses plan of care with the PT. PT agrees and has no questions or concerns. 1430 Dr. Sabina Kennedy at bedside. SVE per MD, unchanged. Md discussing possible CS with PT.     1440 PT tearful about possible CS. This RN at bedside answering questions and addressing concerns. 0 Dr. Sabina Kennedy at bedside. SVE per MD, 7/90/-2.     2987 Dr. Sabina Kennedy at bedside. SVE per MD, 9/95/-2.     6164 PT called out reporting pressure in pelvis and butt. SVE per RN, 9 (lip)/95/-1. PT placed in high throne position. 1900 SBAR report given to YVES Markham, RYLEY. Care handed off at this time.

## 2022-06-09 PROBLEM — Z3A.40 40 WEEKS GESTATION OF PREGNANCY: Status: ACTIVE | Noted: 2022-06-09

## 2022-06-09 PROCEDURE — 75410000003 HC RECOV DEL/VAG/CSECN EA 0.5 HR: Performed by: OBSTETRICS & GYNECOLOGY

## 2022-06-09 PROCEDURE — 77030005513 HC CATH URETH FOL11 MDII -B

## 2022-06-09 PROCEDURE — 75410000002 HC LABOR FEE PER 1 HR: Performed by: OBSTETRICS & GYNECOLOGY

## 2022-06-09 PROCEDURE — 77030021125

## 2022-06-09 PROCEDURE — 75410000000 HC DELIVERY VAGINAL/SINGLE: Performed by: OBSTETRICS & GYNECOLOGY

## 2022-06-09 PROCEDURE — 0DQR0ZZ REPAIR ANAL SPHINCTER, OPEN APPROACH: ICD-10-PCS | Performed by: OBSTETRICS & GYNECOLOGY

## 2022-06-09 PROCEDURE — 74011250636 HC RX REV CODE- 250/636: Performed by: OBSTETRICS & GYNECOLOGY

## 2022-06-09 PROCEDURE — 74011250637 HC RX REV CODE- 250/637: Performed by: OBSTETRICS & GYNECOLOGY

## 2022-06-09 PROCEDURE — 77030019905 HC CATH URETH INTMIT MDII -A

## 2022-06-09 PROCEDURE — 59400 OBSTETRICAL CARE: CPT | Performed by: OBSTETRICS & GYNECOLOGY

## 2022-06-09 PROCEDURE — 2709999900 HC NON-CHARGEABLE SUPPLY

## 2022-06-09 PROCEDURE — 65270000029 HC RM PRIVATE

## 2022-06-09 PROCEDURE — 76060000078 HC EPIDURAL ANESTHESIA: Performed by: STUDENT IN AN ORGANIZED HEALTH CARE EDUCATION/TRAINING PROGRAM

## 2022-06-09 PROCEDURE — 74011000258 HC RX REV CODE- 258: Performed by: OBSTETRICS & GYNECOLOGY

## 2022-06-09 RX ORDER — POLYETHYLENE GLYCOL 3350 17 G/17G
17 POWDER, FOR SOLUTION ORAL
Status: DISCONTINUED | OUTPATIENT
Start: 2022-06-09 | End: 2022-06-11 | Stop reason: HOSPADM

## 2022-06-09 RX ORDER — OXYCODONE HYDROCHLORIDE 5 MG/1
10 TABLET ORAL
Status: DISCONTINUED | OUTPATIENT
Start: 2022-06-09 | End: 2022-06-11 | Stop reason: HOSPADM

## 2022-06-09 RX ORDER — IBUPROFEN 800 MG/1
800 TABLET ORAL EVERY 8 HOURS
Status: DISCONTINUED | OUTPATIENT
Start: 2022-06-09 | End: 2022-06-11 | Stop reason: HOSPADM

## 2022-06-09 RX ORDER — HYDROCORTISONE ACETATE PRAMOXINE HCL 2.5; 1 G/100G; G/100G
CREAM TOPICAL AS NEEDED
Status: DISCONTINUED | OUTPATIENT
Start: 2022-06-09 | End: 2022-06-09

## 2022-06-09 RX ORDER — ONDANSETRON 2 MG/ML
4 INJECTION INTRAMUSCULAR; INTRAVENOUS
Status: DISCONTINUED | OUTPATIENT
Start: 2022-06-09 | End: 2022-06-11 | Stop reason: HOSPADM

## 2022-06-09 RX ORDER — OXYCODONE HYDROCHLORIDE 5 MG/1
5 TABLET ORAL
Status: DISCONTINUED | OUTPATIENT
Start: 2022-06-09 | End: 2022-06-11 | Stop reason: HOSPADM

## 2022-06-09 RX ORDER — ACETAMINOPHEN 325 MG/1
650 TABLET ORAL
Status: DISCONTINUED | OUTPATIENT
Start: 2022-06-09 | End: 2022-06-11 | Stop reason: HOSPADM

## 2022-06-09 RX ORDER — DIPHENHYDRAMINE HCL 25 MG
25 CAPSULE ORAL
Status: DISCONTINUED | OUTPATIENT
Start: 2022-06-09 | End: 2022-06-11 | Stop reason: HOSPADM

## 2022-06-09 RX ORDER — ZOLPIDEM TARTRATE 5 MG/1
5 TABLET ORAL
Status: DISCONTINUED | OUTPATIENT
Start: 2022-06-09 | End: 2022-06-11 | Stop reason: HOSPADM

## 2022-06-09 RX ORDER — KETOROLAC TROMETHAMINE 30 MG/ML
30 INJECTION, SOLUTION INTRAMUSCULAR; INTRAVENOUS
Status: COMPLETED | OUTPATIENT
Start: 2022-06-09 | End: 2022-06-09

## 2022-06-09 RX ORDER — OXYTOCIN/RINGER'S LACTATE 30/500 ML
87.3 PLASTIC BAG, INJECTION (ML) INTRAVENOUS AS NEEDED
Status: DISCONTINUED | OUTPATIENT
Start: 2022-06-09 | End: 2022-06-11 | Stop reason: HOSPADM

## 2022-06-09 RX ORDER — OXYCODONE HYDROCHLORIDE 5 MG/1
5 TABLET ORAL
Status: COMPLETED | OUTPATIENT
Start: 2022-06-09 | End: 2022-06-09

## 2022-06-09 RX ORDER — OXYTOCIN/RINGER'S LACTATE 30/500 ML
10 PLASTIC BAG, INJECTION (ML) INTRAVENOUS AS NEEDED
Status: COMPLETED | OUTPATIENT
Start: 2022-06-09 | End: 2022-06-09

## 2022-06-09 RX ORDER — NALOXONE HYDROCHLORIDE 0.4 MG/ML
0.4 INJECTION, SOLUTION INTRAMUSCULAR; INTRAVENOUS; SUBCUTANEOUS AS NEEDED
Status: DISCONTINUED | OUTPATIENT
Start: 2022-06-09 | End: 2022-06-11 | Stop reason: HOSPADM

## 2022-06-09 RX ORDER — SIMETHICONE 80 MG
80 TABLET,CHEWABLE ORAL
Status: DISCONTINUED | OUTPATIENT
Start: 2022-06-09 | End: 2022-06-11 | Stop reason: HOSPADM

## 2022-06-09 RX ORDER — DOCUSATE SODIUM 100 MG/1
100 CAPSULE, LIQUID FILLED ORAL
Status: DISCONTINUED | OUTPATIENT
Start: 2022-06-09 | End: 2022-06-11 | Stop reason: HOSPADM

## 2022-06-09 RX ADMIN — DOCUSATE SODIUM 100 MG: 100 CAPSULE, LIQUID FILLED ORAL at 09:39

## 2022-06-09 RX ADMIN — OXYCODONE 10 MG: 5 TABLET ORAL at 09:40

## 2022-06-09 RX ADMIN — SODIUM CHLORIDE 1.5 G: 900 INJECTION INTRAVENOUS at 04:00

## 2022-06-09 RX ADMIN — OXYCODONE 5 MG: 5 TABLET ORAL at 05:27

## 2022-06-09 RX ADMIN — OXYCODONE 5 MG: 5 TABLET ORAL at 04:29

## 2022-06-09 RX ADMIN — OXYCODONE 10 MG: 5 TABLET ORAL at 19:08

## 2022-06-09 RX ADMIN — IBUPROFEN 800 MG: 800 TABLET, FILM COATED ORAL at 23:12

## 2022-06-09 RX ADMIN — POLYETHYLENE GLYCOL 3350 17 G: 17 POWDER, FOR SOLUTION ORAL at 07:00

## 2022-06-09 RX ADMIN — OXYTOCIN 30000 MILLI-UNITS: 10 INJECTION, SOLUTION INTRAMUSCULAR; INTRAVENOUS at 02:22

## 2022-06-09 RX ADMIN — OXYCODONE 10 MG: 5 TABLET ORAL at 23:12

## 2022-06-09 RX ADMIN — POLYETHYLENE GLYCOL 3350 17 G: 17 POWDER, FOR SOLUTION ORAL at 23:12

## 2022-06-09 RX ADMIN — OXYCODONE 10 MG: 5 TABLET ORAL at 14:50

## 2022-06-09 RX ADMIN — KETOROLAC TROMETHAMINE 30 MG: 30 INJECTION, SOLUTION INTRAMUSCULAR at 05:28

## 2022-06-09 RX ADMIN — IBUPROFEN 800 MG: 800 TABLET, FILM COATED ORAL at 14:50

## 2022-06-09 NOTE — ROUTINE PROCESS
Bedside and verbal shift change report given to oncoming nurse, as assigned, by offgoing nurse, Aryan Melendez RN. Report included SBAR, Kardex, I&Os, Recent Results, Procedures, MAR, and changes in patient status. Oncoming nurse and patient given opportunity for questions.

## 2022-06-09 NOTE — LACTATION NOTE
This note was copied from a baby's chart. Mother states baby will latch on well and suckle on and off. He has been sleepy today - mother reassured this is normal  behavior the first 24 hours. Mother taught hand expression and to express 10-20 drops of colostrum for baby if he is sleepy at feeding time. Discussed with mother her plan for feeding. Reviewed the benefits of exclusive breast milk feeding during the hospital stay. Informed her of the risks of using formula to supplement in the first few days of life as well as the benefits of successful breast milk feeding; referred her to the Breastfeeding booklet about this information. She acknowledges understanding of information reviewed and states that it is her plan to breastfeed her infant. Will support her choice and offer additional information as needed. Encouraged mom to attempt feeding with baby led feeding cues. Just as sucking on fingers, rooting, mouthing. Looking for 8-12 feedings in 24 hours. Don't limit baby at breast, allow baby to come of breast on it's own. Baby may want to feed  often and may increase number of feedings on second day of life. Skin to skin encouraged. If baby doesn't nurse,  Mom should  hand express  10-20 drops of colostrum and drip into baby's mouth, or give to baby by finger feeding, cup feeding, or spoon feeding at least every 2-3 hours. Mother will successfully establish breastfeeding by feeding in response to early feeding cues   or wake every 3h, will obtain deep latch, and will keep log of feedings/output. Taught to BF at hunger cues and or q 2-3 hrs and to offer 10-20 drops of hand expressed colostrum at any non-feeds.       Breast Assessment  Left Breast: Medium,Large  Left Nipple: Everted,Intact  Right Breast: Medium,Large  Right Nipple: Everted,Intact  Breast- Feeding Assessment  Attends Breast-Feeding Classes: Yes  Breast-Feeding Experience: No  Breast Trauma/Surgery: No  Type/Quality: [facility]  Lactation Consultant Visits  Breast-Feedings: Fair (Mother states baby last breast fed at 5)       Mother given breastfeeding handouts and LC#.

## 2022-06-09 NOTE — PROGRESS NOTES
Labor Progress Note  Patient seen, fetal heart rate and contraction pattern evaluated, patient examined. Patient is comfortable with epidural.    Relevant history:  29 y.o.  pushing for two hours. She is getting tired. EGA: 40w0d    Physical Exam:  Visit Vitals  /66   Pulse 74   Temp 98.7 °F (37.1 °C)   Resp 16   Ht 5' 4\" (1.626 m)   Wt 88 kg (194 lb)   SpO2 99%   Breastfeeding No   BMI 33.30 kg/m²        29 y.o.  female in no acute distress. Uterine Activity:   Frequency: Every 2 - 3 minutes   Duration: 60 seconds   Intensity: Moderate  Fetal Heart Rate:    Baseline: 130 bpm   Variability: Moderate   Accelerations: Present (15 x 15 bpm)   Decelerations: None  Category: 1    EFW: 8 pounds    Pelvis: Adequate for vaginal delivery    Cervix: Fully dilated and retracted    Membranes: Ruptured    Amniotic Fluid: Clear    Is the fetal head engaged? Yes    Station: +1    Angle of Progression:132 Degrees = Station 1.5 cm  136 degrees     Fetal head position: Right occiput posterior    Asynclitism: Anterior    Caput: +2    Molding: +1 parietal bones meet    Fetal spine location: Maternal right    Fetal position confirmed with ultrasound: Yes    No results found for this or any previous visit (from the past 12 hour(s)). Assessment/Plan:    Active Hospital Problems    Diagnosis Date Noted    Gestational hypertension without significant proteinuria, affecting childbirth 2022     Priority: 1 - One    Persistent occipitoposterior position 2022     Priority: 2 - Two    40 weeks gestation of pregnancy 2022     Priority: 3 - Three         No problem-specific Assessment & Plan notes found for this encounter.       Indication: Maternal exhaustion    Contraindication:None No fetal osetogenesis imperfecta, thrombocytopenia, hemophilia, or von Willebrand disease    Location for procedure: Delivery room    I reviewed the procedure, benefits (avoiding  delivery), and risks including failure,  infection, perineal, vaginal, and cervical lacerations, injury to the urinary bladder, hemorrhage, forcep marks on the baby, injury to the baby ( including facial lacerations, facial nerve palsy, skull fracture, intracranial hemorrhage), and hemorrhage. Long term fetal outcomes are the same as for second stage  delivery.  is the alternative to a trial of forceps. The trial of forceps will be abandoned if I cannot get a good application of the forceps, not making progess with each pull, or it takes longer than fifteen minutes. I will proceed with  if the trial of forceps fails. I reviewed  delivery, benefits, and risks including, infection, incisional problems, injury to adjacent organs, bleeding, and medical complications.  in the second stage of labor increases the risk of uterine, cervical, and vaginal lacerations, hemorrhage, and injury to the bladder. It may also be more difficult to deliver the fetus. I recommend prophylactic antibiotics. I have notified nursing staff, anesthesia, and pediatrics.     Plan: Patient agrees to a trial of forceps, will proceed    Michael Mireles MD  2022

## 2022-06-09 NOTE — PROGRESS NOTES
Labor Progress Note  Patient seen, fetal heart rate and contraction pattern evaluated, patient examined. Patient is comfortable with epidural.  Visit Vitals  /66   Pulse 74   Temp 97.8 °F (36.6 °C)   Resp 16   Ht 5' 4\" (1.626 m)   Wt 88 kg (194 lb)   SpO2 99%   Breastfeeding No   BMI 33.30 kg/m²       Physical Exam:    29 y.o.  in no acute distress. Cervical Exam:   Presentation MERCEDEZ  Dilation 10 cms   Effacement 100 %   Station +1  Membranes: Prior amniotomy: Clear  Uterine Activity:   Frequency: Every 2 minutes   Duration: 60 seconds   Intensity: Moderate  Fetal Heart Rate:    Baseline: 125 bpm   Variability: Moderate   Accelerations: Absent   Decelerations: None  Category: 1    Oxytocin (horace-units/minute): 10      No results found for this or any previous visit (from the past 12 hour(s)).   Assessment/Plan:  Pushing for an hour, keep on    Navi Kessler MD  2022

## 2022-06-09 NOTE — PROGRESS NOTES
Post-Partum Day Number 0    Progress note post vaginal delivery    Pt has no unusual postpartum complaints. Sore from laceration but pain is well controlled with current medications. The baby is doing well. Urinary output is adequate. Voiding without difficulty. The patient is ambulating well. Tolerating a regular diet. Vitals:  Patient Vitals for the past 8 hrs:   BP Temp Pulse Resp SpO2   22 0815 135/77 98.4 °F (36.9 °C) 84 16 100 %   22 -- -- -- -- 97 %   22 -- -- -- -- 98 %   22 120/69 98.6 °F (37 °C) 97 14 100 %   22 -- -- -- -- 100 %   22 -- -- -- -- 96 %   22 -- -- -- -- 97 %   22 (!) 143/79 -- 96 -- --   226 -- -- -- -- 99 %   226 -- -- -- -- 99 %   225 132/73 -- 92 -- --   22 -- -- -- -- 100 %   226 -- -- -- -- 98 %   22 -- -- -- -- 98 %   220 129/82 -- 91 -- --   22 -- -- -- -- 99 %   22 -- -- -- -- 97 %   226 -- -- -- -- 97 %   225 118/72 -- (!) 102 -- --   22 -- -- -- -- 96 %   226 -- -- -- -- 97 %   22 -- -- -- -- 97 %   22 0320 (!) 142/77 -- (!) 107 -- --   22 -- -- -- -- 97 %   22 -- -- -- -- 96 %   22 0304 132/67 -- 91 -- --     Temp (24hrs), Av.4 °F (36.9 °C), Min:97.8 °F (36.6 °C), Max:98.7 °F (37.1 °C)      Exam:  Patient without distress. Abdomen soft, fundus firm,  nontender               Perineum with normal lochia noted. Lower extremities are negative for swelling, cords or tenderness. Labs: No results found for this or any previous visit (from the past 24 hour(s)). Assessment:     Status post vaginal delivery. Doing well postpartum day 0.     Plan:     Routine postpartum care

## 2022-06-09 NOTE — PROGRESS NOTES
Labor Progress Note  Patient seen, fetal heart rate and contraction pattern evaluated, patient examined. Patient is comfortable with epidural.  Visit Vitals  /66   Pulse 74   Temp 97.8 °F (36.6 °C)   Resp 16   Ht 5' 4\" (1.626 m)   Wt 88 kg (194 lb)   SpO2 99%   Breastfeeding No   BMI 33.30 kg/m²       Physical Exam:    29 y.o.  in no acute distress. Cervical Exam:   Presentation Vertex  Dilation 10 cms   Effacement 100 %   Station +3  Membranes: Prior amniotomy: Clear  Uterine Activity:   Frequency: Every 2 minutes   Duration: 60 seconds   Intensity: Moderate  Fetal Heart Rate:    Baseline: 115 bpm   Variability: Moderate   Accelerations: Absent   Decelerations: None  Category: 1    Oxytocin (horace-units/minute): 10    No results found for this or any previous visit (from the past 12 hour(s)).   Assessment/Plan:  Start pushing  Cecile Ayers MD  2022

## 2022-06-09 NOTE — L&D DELIVERY NOTE
Forceps Delivery Procedure Note    Delivery Physician:  Aime Russell MD    Indication: Maternal exhaustion    Relevant history:  29 y.o.  pushing for two hours and little descent. Fetal rate category: Category 1    Contraindication:None     EGA: 40w0d    EFW: 8 pounds    Pelvis: Adequate for vaginal delivery    Cervix: Fully dilated and retracted    Membranes: Ruptured    Amniotic Fluid: Clear    Is the fetal head engaged? Yes    Station: +1    Angle of Progression:132 Degrees = Station 1.5 cm 136 degrees    Fetal head position: Right occiput posterior    Asynclitism: Anterior    Caput: +2    Molding: +1 parietal bones meet    Fetal spine location: Maternal right    Fetal position confirmed with ultrasound: Yes    Location for procedure: Delivery room    Anesthesia: Epidural    Maternal bladder emptied: Yes    Forceps type:Issac-Torin for traction and Luikart-Kielland     Initally Luikart-Kieland for rotation from ROP to OA and 6 pulls to station +3. They were removed, but patient was not making progress so Jose Lei applied and just 2 pulls to bring to , then removed. Forceps classification: Mid    Number of pulls: 8    Rotation of head: > 90 degrees    Traction: Moderate    Maternal effort: Moderate    Total time forceps applied to fetal head: 7 minutes    Successful: Yes    Shoulder dystocia: No    Birth:   [facility] the [facility] [place] [id number]   Delivery of a   male infant on 2022 at 2:21 AM. Apgars were 9  and 9 . Umbilical Cord: 3 Vessels     Umbilical Cord Events: None     Placenta: Expressed removal with Normal;Intact appearance.     Amniotic Fluid Volume:  Scant     Amniotic Fluid Description:  Clear        Baby gender: Male    Birth Weight: pending    Apgar - One Minute: 9    Apgar - Five Minutes: 9    Umbilical Cord: 3 vessels present    Placenta Removal: expressed    Placenta Appearance: normal intact    Specimens: None Complications:  none    [place] exam: Forceps annie    Episiotomy: Episiotomy: None    Lacerations: third degree IIIB  Repaired with 2-0 PDS for external sphincter, end to end repair, 2-0 Monocryl for vaginal (right side) and rest of perineal repair.   EBL: 400 mls    QBL: 400 mls        Signed By:  Michelle Almaraz MD     2022

## 2022-06-09 NOTE — PROGRESS NOTES
Labor Progress Note  Patient seen, fetal heart rate and contraction pattern evaluated, patient examined. Patient is comfortable with epidural.  Visit Vitals  /65 (BP 1 Location: Left upper arm, BP Patient Position: At rest)   Pulse 67   Temp 98.2 °F (36.8 °C)   Resp 16   Ht 5' 4\" (1.626 m)   Wt 88 kg (194 lb)   SpO2 97%   Breastfeeding No   BMI 33.30 kg/m²       Physical Exam:    29 y.o.  in no acute distress. Cervical Exam:   Presentation MERCEDEZ  Dilation 9 cms   Effacement 100 %   Station -1  Membranes: Prior amniotomy: Clear  Uterine Activity:   Frequency: Every 2 minutes   Duration: 60 seconds   Intensity: Moderate  Fetal Heart Rate:    Baseline: 125 bpm   Variability: Moderate   Accelerations: Absent   Decelerations: None  Category: 1    Oxytocin (horace-units/minute): 8    Procedure: Attempted to reduce anterior lip, on right side, but was unable to do so. No results found for this or any previous visit (from the past 12 hour(s)). Assessment/Plan:  Gestational hypertension  39 weeks  Slow progress, but almost there. Will continue to observe.        Jacqui Morris MD  2022

## 2022-06-10 PROCEDURE — 65270000029 HC RM PRIVATE

## 2022-06-10 PROCEDURE — 74011250637 HC RX REV CODE- 250/637: Performed by: OBSTETRICS & GYNECOLOGY

## 2022-06-10 PROCEDURE — 77030021125

## 2022-06-10 RX ORDER — OXYCODONE HYDROCHLORIDE 5 MG/1
5 TABLET ORAL
Qty: 22 TABLET | Refills: 0 | Status: SHIPPED | OUTPATIENT
Start: 2022-06-10 | End: 2022-06-17

## 2022-06-10 RX ADMIN — OXYCODONE 10 MG: 5 TABLET ORAL at 03:47

## 2022-06-10 RX ADMIN — IBUPROFEN 800 MG: 800 TABLET, FILM COATED ORAL at 23:16

## 2022-06-10 RX ADMIN — DOCUSATE SODIUM 100 MG: 100 CAPSULE, LIQUID FILLED ORAL at 07:45

## 2022-06-10 RX ADMIN — IBUPROFEN 800 MG: 800 TABLET, FILM COATED ORAL at 14:48

## 2022-06-10 RX ADMIN — OXYCODONE 5 MG: 5 TABLET ORAL at 18:28

## 2022-06-10 RX ADMIN — OXYCODONE 10 MG: 5 TABLET ORAL at 07:45

## 2022-06-10 RX ADMIN — POLYETHYLENE GLYCOL 3350 17 G: 17 POWDER, FOR SOLUTION ORAL at 23:16

## 2022-06-10 RX ADMIN — ACETAMINOPHEN 650 MG: 325 TABLET ORAL at 13:27

## 2022-06-10 RX ADMIN — IBUPROFEN 800 MG: 800 TABLET, FILM COATED ORAL at 07:45

## 2022-06-10 RX ADMIN — POLYETHYLENE GLYCOL 3350 17 G: 17 POWDER, FOR SOLUTION ORAL at 07:46

## 2022-06-10 RX ADMIN — OXYCODONE 5 MG: 5 TABLET ORAL at 23:15

## 2022-06-10 NOTE — DISCHARGE INSTRUCTIONS
POST VAGINAL DELIVERY DISCHARGE INSTRUCTIONS    Name: Kaitlin Treviño  YOB: 1990  Primary Diagnosis: Active Problems:    Gestational hypertension without significant proteinuria, affecting childbirth (6/7/2022)      Persistent occipitoposterior position (6/9/2022)      40 weeks gestation of pregnancy (6/9/2022)        General:     Diet/Diet Restrictions:  Drink plenty of fluids daily (water, juices); avoid excessive caffeine intake. Eat and drink your normal diet. Medications:   See list    Physical Activity / Restrictions / Safety:     Avoid heavy lifting, no more that 15 lbs. For 2-3 weeks; may use of stairs with assistance first few times. No driving until no pain and off pain meds with narcotics. Avoid intercourse 4-6 weeks, no douching or tampon use. You may walk but check with obstetrician before starting or resuming an exercise program.         Discharge Instructions/Special Treatment/Home Care Needs:     Continue prenatal vitamins. Continue to use squirt bottle with warm water on your episiotomy for comfort after each bathroom use as desired. Call the office for the following:     Fever over 101 degrees by mouth. Vaginal bleeding heavier than a normal menstrual period or clots larger than a golf ball. Red streaks or increased swelling of legs, painful red streaks on your breast.  Painful urination, constipation and increased pain or swelling or discharge with your incision. Pain Management:     Pain Management:   Take Acetaminophen (Tylenol) or Ibuprofen (Advil, Motrin), as directed for pain. Use a warm Sitz bath 3 times daily to relieve episiotomy or hemorrhoidal discomfort. For hemorrhoidal discomfort, use Tucks and Anusol cream as needed and directed.     Follow-Up Care:     Appointment with MD:   Follow-up Appointments   Procedures    FOLLOW UP VISIT Appointment in: 6 Weeks     Standing Status:   Standing     Number of Occurrences:   1     Order Specific Question: Appointment in     Answer:   6 Weeks     Telephone number: [phone]      Signed By: Randi Eisenmenger, MD                                                                                                   Date: 6/10/2022 Time: 12:46 PM

## 2022-06-10 NOTE — PROGRESS NOTES
Post-Partum Day Number 1    Progress note post vaginal delivery    Pt has no unusual postpartum complaints. Pain is well controlled with current medications. The baby is doing well. Urinary output is adequate. Voiding without difficulty. The patient is ambulating well. Tolerating a regular diet. Vitals:  No data found. Temp (24hrs), Av.6 °F (37 °C), Min:98.4 °F (36.9 °C), Max:99 °F (37.2 °C)      Exam:  Patient without distress. Abdomen soft, fundus firm,  nontender               Perineum with normal lochia noted. Lower extremities are negative for swelling, cords or tenderness. Labs: No results found for this or any previous visit (from the past 24 hour(s)). Assessment:     Status post vaginal delivery. Doing well postpartum day 1.     Plan:     Routine postpartum care

## 2022-06-10 NOTE — LACTATION NOTE
This note was copied from a baby's chart. LC in to for lactation rounds. Mother had visitors and states she has no concerns at this time. Encouraged mother to call [facility] if needed.

## 2022-06-10 NOTE — DISCHARGE SUMMARY
Obstetrical Discharge Summary     Name: Tee Randhawa MRN: [id number]  SSN: [id number]    YOB: 1990  Age: 29 y.o. Sex: female      Admit Date: 2022    Discharge Date: 2022     Admitting Physician: Kenneth Hammond MD     Attending Physician:  Hipolito Hyde MD     Admission Diagnoses: Encounter for induction of labor [Z34.90]    Discharge Diagnoses:   Information for the patient's :  Fern Royalty Male Darris Canavan [[id number]]   Delivery of a 8 lb 15 oz (4.055 kg) male infant via Vaginal, Forceps on 2022 at 2:21 AM  by Roark Sever. Apgars were 9  and 9 . Additional Diagnoses:   Hospital Problems  Date Reviewed: 2022          Codes Class Noted POA    Persistent occipitoposterior position ICD-10-CM: O64. 0XX0  ICD-9-CM: 660.30  2022 Unknown        40 weeks gestation of pregnancy ICD-10-CM: Z3A.40  ICD-9-CM: V22.2  2022 Yes        Gestational hypertension without significant proteinuria, affecting childbirth ICD-10-CM: O13.4  ICD-9-CM: 642.93  2022 Yes             Lab Results   Component Value Date/Time    GrBStrep, External Negative 2022 12:00 AM       Immunization(s):   Immunization History   Administered Date(s) Administered    Tdap 2022        Hospital Course: Normal hospital course following the delivery. The patient was released to her home in good condition. Patient Instructions:     Reference my discharge instructions. I spent 10 minutes discharging the patient in face to face contact.       Follow-up Appointments   Procedures    FOLLOW UP VISIT Appointment in: 6 Weeks     Standing Status:   Standing     Number of Occurrences:   1     Order Specific Question:   Appointment in     Answer:   6 Weeks        Signed By:  Jaspreet Castorena MD     [place] 10, 2022

## 2022-06-11 VITALS
TEMPERATURE: 99.1 F | OXYGEN SATURATION: 97 % | BODY MASS INDEX: 33.12 KG/M2 | HEIGHT: 64 IN | WEIGHT: 194 LBS | SYSTOLIC BLOOD PRESSURE: 124 MMHG | RESPIRATION RATE: 16 BRPM | DIASTOLIC BLOOD PRESSURE: 56 MMHG | HEART RATE: 85 BPM

## 2022-06-11 PROCEDURE — 2709999900 HC NON-CHARGEABLE SUPPLY

## 2022-06-11 PROCEDURE — 74011250637 HC RX REV CODE- 250/637: Performed by: OBSTETRICS & GYNECOLOGY

## 2022-06-11 RX ADMIN — IBUPROFEN 800 MG: 800 TABLET, FILM COATED ORAL at 07:21

## 2022-06-11 RX ADMIN — ACETAMINOPHEN 650 MG: 325 TABLET ORAL at 14:15

## 2022-06-11 RX ADMIN — OXYCODONE 5 MG: 5 TABLET ORAL at 07:21

## 2022-06-11 RX ADMIN — OXYCODONE 5 MG: 5 TABLET ORAL at 03:22

## 2022-06-11 RX ADMIN — IBUPROFEN 800 MG: 800 TABLET, FILM COATED ORAL at 14:15

## 2022-06-11 NOTE — PROGRESS NOTES
Postpartum Note    Hx: Doing well. Lochia normal.  Pain controlled. No dizziness. Breastfeeding. Exam:   BP Readings from Last 3 Encounters:   06/10/22 128/79   22 138/86   22 132/81      GEN: NAD, lying in bed comfortably  ABD: Soft, NT, UFF  EXT: no calf TTP, no erythema or palpable cords    No results found for this or any previous visit (from the past 24 hour(s)).     Assessment/Plan:  32yo  PPD2 s/p , c/b gestational HTN   · Meeting milestones  · Continue routine postpartum care   · Encourage ambulation and direct breastfeeding    Rere Juarez DO 22

## 2022-06-11 NOTE — LACTATION NOTE
This note was copied from a baby's chart. Mother latched on right breast in football hold with repeated audible swallows. Mothers milk is in Discharge info reviewed. Parents questions answered. Baby remains under phototherapy at this time. Possible late discharge today or tomorrow. Chart shows numerous feedings, void, stool WNL. Discussed importance of monitoring outputs and feedings on first week of life. Discussed ways to tell if baby is  getting enough breast milk, ie  voids and stools, change in color of stool, and return to birth wt within 2 weeks. Follow up with pediatrician visit for weight check in 1-2 days (per AAP guidelines.)  Encouraged to call Warm Line  [phone]  for any questions/problems that arise. Mother also given breastfeeding support group dates and times for any future needs    Engorgement Care Guidelines:  Reviewed how milk is made and normal phases of milk production. Taught care of engorged breasts - frequent breastfeeding encouraged, cool packs and motrin as tolerated. Anticipatory guidance shared. Pt will successfully establish breastfeeding by feeding in response to early feeding cues or wake every 3h, will obtain deep latch, and will keep log of feedings/output. Taught to BF at hunger cues and or q 2-3 hrs and to offer 10-20 drops of hand expressed colostrum at any non-feeds.       Breast Assessment  Left Breast: (P) Medium  Left Nipple: (P) Everted,Intact  Right Breast: (P) Medium  Right Nipple: (P) Everted,Intact  Breast- Feeding Assessment  Attends Breast-Feeding Classes: Yes  Breast-Feeding Experience: No  Breast Trauma/Surgery: No  Type/Quality: [facility]  Lactation Consultant Visits  Breast-Feedings: (P) Good   Mother/Infant Observation  Mother Observation: (P) Breast comfortable  Infant Observation: (P) Rhythmic suck,Relaxed after feeding,Opens mouth,Lips flanged, upper,Latches nipple and aereolae,Lips flanged, lower,Feeding cues,Audible swallows  LATCH Documentation  Latch: (P) Grasps breast, tongue down, lips flanged, rhythmic sucking  Audible Swallowing: (P) Spontaneous and intermittent (24 hours old)  Type of Nipple: (P) Everted (after stimulation)  Comfort (Breast/Nipple): (P) Soft/non-tender  Hold (Positioning): (P) Full assist, teach one side, mother does other, staff holds  LATCH Score: (P) 9

## 2022-07-12 RX ORDER — DICLOXACILLIN SODIUM 500 MG/1
500 CAPSULE ORAL
Qty: 28 CAPSULE | Refills: 0 | Status: SHIPPED | OUTPATIENT
Start: 2022-07-12 | End: 2022-07-12

## 2022-07-12 RX ORDER — DICLOXACILLIN SODIUM 500 MG/1
500 CAPSULE ORAL
Qty: 28 CAPSULE | Refills: 0 | Status: SHIPPED | OUTPATIENT
Start: 2022-07-12 | End: 2022-07-19

## 2022-08-01 ENCOUNTER — OFFICE VISIT (OUTPATIENT)
Dept: OBGYN CLINIC | Age: 32
End: 2022-08-01
Payer: COMMERCIAL

## 2022-08-01 VITALS — BODY MASS INDEX: 28.91 KG/M2 | DIASTOLIC BLOOD PRESSURE: 77 MMHG | WEIGHT: 168.4 LBS | SYSTOLIC BLOOD PRESSURE: 120 MMHG

## 2022-08-01 DIAGNOSIS — Z11.51 SCREENING FOR HPV (HUMAN PAPILLOMAVIRUS): ICD-10-CM

## 2022-08-01 PROCEDURE — 0503F POSTPARTUM CARE VISIT: CPT | Performed by: OBSTETRICS & GYNECOLOGY

## 2022-08-01 NOTE — PROGRESS NOTES
Postpartum evaluation    Sarita Sharif is a 29 y.o. female who presents for a postpartum exam.     She is now six weeks post normal spontaneous vaginal delivery. Her baby is doing well. She has had some vaginal bleeding on and off since delivery. She has had the following significant problems since her delivery: none    The patient is breast feeding without difficulty. The patient does not want to be on birth control right now. She is currently taking: no medications. She is due for her next AE in 12 months.      Visit Vitals  /77   Wt 168 lb 6.4 oz (76.4 kg)   Breastfeeding Yes   BMI 28.91 kg/m²       PHYSICAL EXAMINATION    Constitutional  Appearance: well-nourished, well developed, alert, in no acute distress    HENT  Head and Face: appears normal    Neck  Inspection/Palpation: normal appearance, no masses or tenderness  Lymph Nodes: no lymphadenopathy present  Thyroid: gland size normal, nontender, no nodules or masses present on palpation    Breasts  Inspection of Breasts: breasts symmetrical, no skin changes, no discharge present, nipple appearance normal, no skin retraction present  Palpation of Breasts and Axillae: no masses present on palpation, no breast tenderness  Axillary Lymph Nodes: no lymphadenopathy present    Gastrointestinal  Abdominal Examination: abdomen non-tender to palpation, normal bowel sounds, no masses present  Liver and spleen: no hepatomegaly present, spleen not palpable  Hernias: no hernias identified    Genitourinary  External Genitalia: normal appearance for age, no discharge present, no tenderness present, no inflammatory lesions present, no masses present, no atrophy present  Vagina: normal vaginal vault without central or paravaginal defects, no discharge present, no inflammatory lesions present, no masses present  Bladder: non-tender to palpation  Urethra: appears normal  Cervix: normal   Uterus: normal size, shape and consistency  Adnexa: no adnexal tenderness present, no adnexal masses present  Perineum: perineum within normal limits, no evidence of trauma, no rashes or skin lesions present  Anus: anus within normal limits, no hemorrhoids present  Inguinal Lymph Nodes: no lymphadenopathy present    Skin  General Inspection: no rash, no lesions identified    Neurologic/Psychiatric  Mental Status:  Orientation: grossly oriented to person, place and time  Mood and Affect: mood normal, affect appropriate    Assessment:  Normal postpartum check  Pap done. Plan:  RTO for AE in 12 months. Rx for contraception: Not ready to decide yet. Will consider Mirena or POP.

## 2022-08-05 ENCOUNTER — PATIENT MESSAGE (OUTPATIENT)
Dept: OBGYN CLINIC | Age: 32
End: 2022-08-05

## 2022-08-05 LAB
CYTOLOGIST CVX/VAG CYTO: ABNORMAL
CYTOLOGY CVX/VAG DOC CYTO: ABNORMAL
CYTOLOGY CVX/VAG DOC THIN PREP: ABNORMAL
CYTOLOGY HISTORY:: ABNORMAL
DX ICD CODE: ABNORMAL
DX ICD CODE: ABNORMAL
HPV I/H RISK 4 DNA CVX QL PROBE+SIG AMP: NEGATIVE
Lab: ABNORMAL
OTHER STN SPEC: ABNORMAL
PATHOLOGIST CVX/VAG CYTO: ABNORMAL
STAT OF ADQ CVX/VAG CYTO-IMP: ABNORMAL

## 2023-02-22 ENCOUNTER — VIRTUAL VISIT (OUTPATIENT)
Dept: FAMILY MEDICINE CLINIC | Age: 33
End: 2023-02-22
Payer: COMMERCIAL

## 2023-02-22 DIAGNOSIS — L42 PITYRIASIS ROSEA: Primary | ICD-10-CM

## 2023-02-22 PROCEDURE — 99203 OFFICE O/P NEW LOW 30 MIN: CPT | Performed by: FAMILY MEDICINE

## 2023-02-22 RX ORDER — ASCORBIC ACID 500 MG
500 TABLET ORAL DAILY
COMMUNITY

## 2023-02-22 RX ORDER — CHOLECALCIFEROL (VITAMIN D3) 50 MCG
CAPSULE ORAL
COMMUNITY

## 2023-02-22 NOTE — PROGRESS NOTES
Chief Complaint   Patient presents with    Rash     Upper stomach area, patient states that the area is red and patchy but no itchy. 1. Have you been to the ER, urgent care clinic since your last visit? Hospitalized since your last visit? No    2. Have you seen or consulted any other health care providers outside of the [address] [place] [address] since your last visit? Include any pap smears or colon screening.  No

## 2023-03-07 NOTE — PROGRESS NOTES
Manuella Snellen is a 34 y.o. female who was seen by synchronous (real-time) audio-video technology on 2/22/2023. Consent: Manuella Snellen, who was seen by synchronous (real-time) audio-video technology, and/or her healthcare decision maker, is aware that this patient-initiated, Telehealth encounter on 2/22/2023 is a billable service, with coverage as determined by her insurance carrier. She is aware that she may receive a bill and has provided verbal consent to proceed: Yes. Assessment & Plan:       ICD-10-CM ICD-9-CM    1. Pityriasis rosea  L42 696.3         Almost certainly CT  Not bothering her, this is self limited and will watch without intervention  Hot showers/dry skin may be more likely to make itchy, so modify behavior as needed  No need for oral or topical medication  If worsening/changing see me or derm        Pt was counseled on risks, benefits and alternatives of treatment options. All questions were asked and answered and the patient was agreeable with the treatment plan as outlined. Subjective: Manuella Snellen is a 34 y.o. female who was seen for Rash (Upper stomach area, patient states that the area is red and patchy but no itchy. )      Noticed weird splotchy spots on top of stomach  Didn't notice them with pregnancy, they came after pregnancy they didn't go away  She thinks it has been there for 6+ months at this point  Not itching  Not raised  Pink/flat rash  Mostly on belly    Medications, allergies, PMH, PSH, SOCH, JEANNETTE [facility]ILL[facility]ENC[facility] OF [facility] reviewed and updated per routine protocol, see chart for review and changes if not noted here. ROS  A 12 point review of systems was negative except as noted here or in the HPI.     Objective:   Vital Signs: (As obtained by patient/caregiver at home)  Patient-Reported Vitals 2/22/2023   Patient-Reported Weight 168lb   Patient-Reported Height -   Patient-Reported Systolic  (No Data)   Patient-Reported LMP 02/14/23        [INSTRUCTIONS:  \"[x]\" Indicates a positive item \"[]\" Indicates a negative item  -- DELETE ALL ITEMS NOT EXAMINED]    Constitutional: [x] Appears well-developed and well-nourished [x] No apparent distress      [] Abnormal -     Mental status: [x] Alert and awake  [x] Oriented to person/place/time [x] Able to follow commands    [] Abnormal -     Eyes:   EOM    [x]  Normal    [] Abnormal -   Sclera  [x]  Normal    [] Abnormal -          Discharge [x]  None visible   [] Abnormal -     HENT: [x] Normocephalic, atraumatic  [] Abnormal -   [x] Mouth/Throat: Mucous membranes are moist    External Ears [x] Normal  [] Abnormal -    Neck: [x] No visualized mass [] Abnormal -     Pulmonary/Chest: [x] Respiratory effort normal   [x] No visualized signs of difficulty breathing or respiratory distress        [] Abnormal -      Musculoskeletal:   [] Normal gait with no signs of ataxia         [x] Normal range of motion of neck        [] Abnormal -     Neurological:        [x] No Facial Asymmetry (Cranial nerve 7 motor function) (limited exam due to video visit)          [x] No gaze palsy        [] Abnormal -          Skin:        [x] No significant exanthematous lesions or discoloration noted on facial skin         [] Abnormal -            Psychiatric:       [x] Normal Affect [] Abnormal -        [x] No Hallucinations    Other pertinent observable physical exam findings:seated no distress          We discussed the expected course, resolution and complications of the diagnosis(es) in detail. Medication risks, benefits, costs, interactions, and alternatives were discussed as indicated. I advised her to contact the office if her condition worsens, changes or fails to improve as anticipated. She expressed understanding with the diagnosis(es) and plan. Tara Roque is a 34 y.o. female who was evaluated by a video visit encounter for concerns as above. Patient identification was verified prior to start of the visit. A caregiver was present when appropriate.  Due to this being a TeleHealth encounter (During [facility] public health emergency), evaluation of the following organ systems was limited: Vitals/Constitutional/EENT/Resp/CV/GI//MS/Neuro/Skin/Heme-Lymph-Imm. Pursuant to the emergency declaration under the [address] [place] [address], [facility] waiver authority and the [employer] Act, this Virtual  Visit was conducted, with patient's (and/or legal guardian's) consent, to reduce the patient's risk of exposure to COVID-19 and provide necessary medical care. Services were provided through a video synchronous discussion virtually to substitute for in-person clinic visit. Patient and provider were located at their individual homes. Lionel Silva MD  [facility]  02/22/23 11:33 AM     Portions of this note may have been populated using smart dictation software and may have \"sounds-like\" errors present. Possible SROM/Vaginal Bleeding

## 2023-05-19 RX ORDER — ASCORBIC ACID 500 MG
500 TABLET ORAL DAILY
COMMUNITY

## 2023-08-24 NOTE — PROGRESS NOTES
Ashwin Sorto is a 34 y.o. female returns for an annual exam     Chief Complaint   Patient presents with    Annual Exam       Patient's last menstrual period was 08/14/2023. Her periods are moderate in flow and usually regular with a 26-32 day interval with 3-7 day duration. She does not have dysmenorrhea. Problems: no problems  Birth Control: none. Last Pap: abnormal obtained 1 year(s) ago. She does not have a history of LEANDRO 2, 3 or cervical cancer. With regard to the Gardisil vaccine, she has received all 3 injections      1. Have you been to the ER, urgent care clinic, or hospitalized since your last visit? No    2. Have you seen or consulted any other health care providers outside of the [address] [place] [address] since your last visit? No    Examination chaperoned by Nas Oliveros LPN.

## 2023-08-25 ENCOUNTER — OFFICE VISIT (OUTPATIENT)
Age: 33
End: 2023-08-25
Payer: COMMERCIAL

## 2023-08-25 VITALS — BODY MASS INDEX: 28.49 KG/M2 | WEIGHT: 166 LBS

## 2023-08-25 DIAGNOSIS — Z01.419 ENCOUNTER FOR ANNUAL ROUTINE GYNECOLOGICAL EXAMINATION: Primary | ICD-10-CM

## 2023-08-25 DIAGNOSIS — Z12.4 ENCOUNTER FOR PAPANICOLAOU SMEAR FOR CERVICAL CANCER SCREENING: ICD-10-CM

## 2023-08-25 PROCEDURE — 99395 PREV VISIT EST AGE 18-39: CPT | Performed by: OBSTETRICS & GYNECOLOGY

## 2023-08-25 NOTE — PROGRESS NOTES
Annual exam      Bea Jean is a No obstetric history on file. ,  34 y.o. female   Patient's last menstrual period was 08/14/2023. She presents for her annual checkup. She is having no problems. Menstrual status:    Her periods are moderate in flow and usually regular with a 26-32 day interval with 3-7 day duration. She does not have dysmenorrhea. Sexual history:    She  has no history on file for sexual activity. Birth Control: none. Has stored embryos, may use later this year. Per Nursing Note:  Last Pap: abnormal obtained 1 year(s) ago. She does not have a history of LEANDRO 2, 3 or cervical cancer. With regard to the Gardisil vaccine, she has received all 3 injections    Past Medical History:   Diagnosis Date    Chlamydia     HPV vaccine counseling     Gardasil, 3/3 injections    Pap smear for cervical cancer screening 04/23/2013    negative     Past Surgical History:   Procedure Laterality Date    GYN  06/30/2020    Hysterosalpingogram    HEENT      wisdom teeth removed    TONSILLECTOMY      UPPER GASTROINTESTINAL ENDOSCOPY  04/2017       Current Outpatient Medications   Medication Sig Dispense Refill    ascorbic acid (VITAMIN C) 500 MG tablet Take 1 tablet by mouth daily       No current facility-administered medications for this visit. Allergies: Patient has no known allergies. Tobacco History:  reports that she has never smoked. She has never used smokeless tobacco.  Alcohol Abuse:  reports no history of alcohol use. Drug Abuse:  reports no history of drug use.     Family Medical/Cancer History:   Family History   Problem Relation Age of Onset    No Known Problems Father     No Known Problems Mother         Review of Systems - History obtained from the patient  Constitutional: negative for weight loss, fever, night sweats  HEENT: negative for hearing loss, earache, congestion, snoring, sorethroat  CV: negative for chest pain, palpitations, edema  Resp: negative for cough,

## 2023-09-01 LAB
CYTOLOGIST CVX/VAG CYTO: NORMAL
CYTOLOGY CVX/VAG DOC CYTO: NORMAL
CYTOLOGY CVX/VAG DOC THIN PREP: NORMAL
DX ICD CODE: NORMAL
HPV GENOTYPE REFLEX: NORMAL
HPV I/H RISK 4 DNA CVX QL PROBE+SIG AMP: NEGATIVE
Lab: NORMAL
OTHER STN SPEC: NORMAL
STAT OF ADQ CVX/VAG CYTO-IMP: NORMAL

## 2024-04-22 ENCOUNTER — ROUTINE PRENATAL (OUTPATIENT)
Age: 34
End: 2024-04-22

## 2024-04-22 VITALS — SYSTOLIC BLOOD PRESSURE: 103 MMHG | DIASTOLIC BLOOD PRESSURE: 65 MMHG | WEIGHT: 172.6 LBS | BODY MASS INDEX: 29.63 KG/M2

## 2024-04-22 DIAGNOSIS — Z34.81 ENCOUNTER FOR SUPERVISION OF OTHER NORMAL PREGNANCY IN FIRST TRIMESTER: Primary | ICD-10-CM

## 2024-04-22 DIAGNOSIS — Z34.80 ENCOUNTER FOR SUPERVISION OF OTHER NORMAL PREGNANCY, UNSPECIFIED TRIMESTER: ICD-10-CM

## 2024-04-22 PROBLEM — Z34.00 ENCOUNTER FOR SUPERVISION OF NORMAL FIRST PREGNANCY, UNSPECIFIED TRIMESTER: Status: RESOLVED | Noted: 2021-12-03 | Resolved: 2024-04-22

## 2024-04-22 PROBLEM — Z3A.40 40 WEEKS GESTATION OF PREGNANCY: Status: RESOLVED | Noted: 2022-06-09 | Resolved: 2024-04-22

## 2024-04-22 LAB
C. TRACHOMATIS, EXTERNAL RESULT: NEGATIVE
N. GONORRHOEAE, EXTERNAL RESULT: NEGATIVE

## 2024-04-22 PROCEDURE — 0500F INITIAL PRENATAL CARE VISIT: CPT | Performed by: OBSTETRICS & GYNECOLOGY

## 2024-04-22 RX ORDER — CYANOCOBALAMIN (VITAMIN B-12) 500 MCG
TABLET ORAL
COMMUNITY

## 2024-04-22 RX ORDER — CALCIUM CARBONATE 500(1250)
500 TABLET ORAL DAILY
COMMUNITY

## 2024-04-22 SDOH — ECONOMIC STABILITY: HOUSING INSECURITY
IN THE LAST 12 MONTHS, WAS THERE A TIME WHEN YOU DID NOT HAVE A STEADY PLACE TO SLEEP OR SLEPT IN A SHELTER (INCLUDING NOW)?: NO

## 2024-04-22 SDOH — ECONOMIC STABILITY: FOOD INSECURITY: WITHIN THE PAST 12 MONTHS, YOU WORRIED THAT YOUR FOOD WOULD RUN OUT BEFORE YOU GOT MONEY TO BUY MORE.: NEVER TRUE

## 2024-04-22 SDOH — ECONOMIC STABILITY: FOOD INSECURITY: WITHIN THE PAST 12 MONTHS, THE FOOD YOU BOUGHT JUST DIDN'T LAST AND YOU DIDN'T HAVE MONEY TO GET MORE.: NEVER TRUE

## 2024-04-22 SDOH — ECONOMIC STABILITY: INCOME INSECURITY: HOW HARD IS IT FOR YOU TO PAY FOR THE VERY BASICS LIKE FOOD, HOUSING, MEDICAL CARE, AND HEATING?: NOT HARD AT ALL

## 2024-04-22 ASSESSMENT — PATIENT HEALTH QUESTIONNAIRE - PHQ9
SUM OF ALL RESPONSES TO PHQ QUESTIONS 1-9: 0
SUM OF ALL RESPONSES TO PHQ9 QUESTIONS 1 & 2: 0
2. FEELING DOWN, DEPRESSED OR HOPELESS: NOT AT ALL
1. LITTLE INTEREST OR PLEASURE IN DOING THINGS: NOT AT ALL
SUM OF ALL RESPONSES TO PHQ QUESTIONS 1-9: 0

## 2024-04-22 NOTE — PROGRESS NOTES
Current pregnancy history:    Jeannine Tong is a ,  34 y.o. female White (non-) Patient's last menstrual period was 2023..  She presents for the evaluation of amenorrhea and a positive pregnancy test.    Per nursing Note:  Jeannine Tong is a 34 y.o. female presents for a new pregnancy visit.    Last Pap: see report obtained 8 months  ago.    LMP history:  The date of her LMP is 2/3/2024 certain.  Her last menstrual period was normal and lasted for 4 to 5 days. IVF    Based on her LMP, her EDC is 2024 and her EGA is 11 weeks, 2 days. Her menstrual cycles are regular and occur approximately every 28 days and range from 3 to 5 days. The last menses lasted  the usual number of days.      Ultrasound data:  She had an ultrasound done today and was 11 weeks and 4 days giving an EDC of 2024--consistent with her IVF date.    Pregnancy symptoms:    Since her LMP she has experienced urinary frequency, breast tenderness, and nausea.   She has not been vomiting over the last few weeks.  Associated signs and symptoms which she denies: dysuria, discharge, vaginal bleeding.    She states she has gained weight:  Approximately 5 pounds over the last few weeks.    Relevant past pregnancy history:   She has the following pregnancy history: Forceps delivery of a nearly 9 pound baby--male doing well.   She has no history of  delivery.    Relevant past medical history:(relevant to this pregnancy): noncontributory.      Her occupation is: admin.     1. Have you been to the ER, urgent care clinic, or hospitalized since your last visit? No    2. Have you seen or consulted any other health care providers outside of the [facility] since your last visit? No    Examination chaperoned by Nathanael Souza LPN.    Substance history: negative for alcohol, tobacco and street drugs.           Positive for nothing.  Exposure history: There is/are no indoor cat/s in the home.  The patient was instructed

## 2024-04-23 LAB
ABO GROUP BLD: NORMAL
ABO, EXTERNAL RESULT: NORMAL
BLD GP AB SCN SERPL QL: NEGATIVE
ERYTHROCYTE [DISTWIDTH] IN BLOOD BY AUTOMATED COUNT: 12 % (ref 11.7–15.4)
HBV SURFACE AG SERPL QL IA: NEGATIVE
HCT VFR BLD AUTO: 40.9 % (ref 34–46.6)
HCV IGG SERPL QL IA: NON REACTIVE
HEP B, EXTERNAL RESULT: NEGATIVE
HEPATITIS C ANTIBODY, EXTERNAL RESULT: NON REACTIVE
HGB BLD-MCNC: 13.1 G/DL (ref 11.1–15.9)
HIV 1+2 AB+HIV1 P24 AG SERPL QL IA: NON REACTIVE
HIV, EXTERNAL RESULT: NON REACTIVE
MCH RBC QN AUTO: 28.4 PG (ref 26.6–33)
MCHC RBC AUTO-ENTMCNC: 32 G/DL (ref 31.5–35.7)
MCV RBC AUTO: 89 FL (ref 79–97)
PLATELET # BLD AUTO: 220 X10E3/UL (ref 150–450)
RBC # BLD AUTO: 4.62 X10E6/UL (ref 3.77–5.28)
RH BLD: POSITIVE
RH FACTOR, EXTERNAL RESULT: POSITIVE
RUBELLA TITER, EXTERNAL RESULT: NORMAL
T. PALLIDUM (SYPHILIS) ANTIBODY, EXTERNAL RESULT: NON REACTIVE
WBC # BLD AUTO: 5.6 X10E3/UL (ref 3.4–10.8)

## 2024-04-24 LAB
BACTERIA UR CULT: NO GROWTH
RUBV IGG SERPL IA-ACNC: 31.1 INDEX
TREPONEMA PALLIDUM IGG+IGM AB [PRESENCE] IN SERUM OR PLASMA BY IMMUNOASSAY: NON REACTIVE

## 2024-04-25 LAB
A VAGINAE DNA VAG QL NAA+PROBE: NORMAL SCORE
BVAB2 DNA VAG QL NAA+PROBE: NORMAL SCORE
C ALBICANS DNA VAG QL NAA+PROBE: NEGATIVE
C GLABRATA DNA VAG QL NAA+PROBE: NEGATIVE
C TRACH DNA VAG QL NAA+PROBE: NEGATIVE
MEGA1 DNA VAG QL NAA+PROBE: NORMAL SCORE
N GONORRHOEA DNA VAG QL NAA+PROBE: NEGATIVE
T VAGINALIS DNA VAG QL NAA+PROBE: NEGATIVE

## 2024-05-20 ENCOUNTER — ROUTINE PRENATAL (OUTPATIENT)
Age: 34
End: 2024-05-20

## 2024-05-20 VITALS — DIASTOLIC BLOOD PRESSURE: 72 MMHG | WEIGHT: 169.3 LBS | SYSTOLIC BLOOD PRESSURE: 115 MMHG | BODY MASS INDEX: 29.06 KG/M2

## 2024-05-20 DIAGNOSIS — Z34.82 ENCOUNTER FOR SUPERVISION OF OTHER NORMAL PREGNANCY, SECOND TRIMESTER: Primary | ICD-10-CM

## 2024-05-20 DIAGNOSIS — Z34.80 ENCOUNTER FOR SUPERVISION OF OTHER NORMAL PREGNANCY, UNSPECIFIED TRIMESTER: ICD-10-CM

## 2024-05-20 PROCEDURE — 0502F SUBSEQUENT PRENATAL CARE: CPT | Performed by: OBSTETRICS & GYNECOLOGY

## 2024-05-20 NOTE — PROGRESS NOTES
TA ULTRASOUND PERFORMED UTERUS IS ANTEVERTED A SINGLE VIABLE 15W3D IUP IS SEEN WITH NORMAL CARDIAC RHYTHM. GESTATIONAL AGE BASED ON TODAY'S ULTRASOUND. THE PLACENTA APPEARS TO BE LOW LYING. RIGHT ADNEXA APPEARS WITHIN NORMAL LIMITS. LEFT ADNEXA APPEARS WITHIN NORMAL LIMITS. NO FREE FLUID SEEN IN THE CDS.

## 2024-05-20 NOTE — PROGRESS NOTES
US for spotting=Low lying placenta.  Avoid IC for a week.  Repeat US in 5 weeks for fetal scan and recheck placenta.

## 2024-05-23 LAB
AFP INTERP SERPL-IMP: NORMAL
AFP SERPL-MCNC: NORMAL NG/ML
COMMENT: NORMAL
Lab: NORMAL

## 2024-05-30 LAB
AFP INTERP SERPL-IMP: NORMAL
AFP INTERP SERPL-IMP: NORMAL
AFP MOM SERPL: 1.29
AFP SERPL-MCNC: 36.8 NG/ML
AGE AT DELIVERY: 32.8 YR
COMMENT: NORMAL
GA METHOD: NORMAL
GA: 15.4 WEEKS
IDDM PATIENT QL: NO
Lab: NORMAL
MULTIPLE PREGNANCY: NO
NEURAL TUBE DEFECT RISK FETUS: 4947 %

## 2024-06-24 ENCOUNTER — ROUTINE PRENATAL (OUTPATIENT)
Age: 34
End: 2024-06-24

## 2024-06-24 VITALS — BODY MASS INDEX: 29.63 KG/M2 | WEIGHT: 172.6 LBS | SYSTOLIC BLOOD PRESSURE: 114 MMHG | DIASTOLIC BLOOD PRESSURE: 80 MMHG

## 2024-06-24 DIAGNOSIS — Z34.82 ENCOUNTER FOR SUPERVISION OF OTHER NORMAL PREGNANCY, SECOND TRIMESTER: Primary | ICD-10-CM

## 2024-06-24 DIAGNOSIS — B37.9 YEAST INFECTION: ICD-10-CM

## 2024-06-24 PROCEDURE — 0502F SUBSEQUENT PRENATAL CARE: CPT | Performed by: OBSTETRICS & GYNECOLOGY

## 2024-06-27 LAB
A VAGINAE DNA VAG QL NAA+PROBE: ABNORMAL SCORE
BVAB2 DNA VAG QL NAA+PROBE: ABNORMAL SCORE
C ALBICANS DNA VAG QL NAA+PROBE: POSITIVE
C GLABRATA DNA VAG QL NAA+PROBE: NEGATIVE
MEGA1 DNA VAG QL NAA+PROBE: ABNORMAL SCORE

## 2024-07-22 ENCOUNTER — ROUTINE PRENATAL (OUTPATIENT)
Age: 34
End: 2024-07-22

## 2024-07-22 VITALS — WEIGHT: 178.2 LBS | DIASTOLIC BLOOD PRESSURE: 78 MMHG | SYSTOLIC BLOOD PRESSURE: 116 MMHG | BODY MASS INDEX: 30.59 KG/M2

## 2024-07-22 DIAGNOSIS — Z34.82 ENCOUNTER FOR SUPERVISION OF OTHER NORMAL PREGNANCY, SECOND TRIMESTER: Primary | ICD-10-CM

## 2024-07-22 PROCEDURE — 0502F SUBSEQUENT PRENATAL CARE: CPT | Performed by: OBSTETRICS & GYNECOLOGY

## 2024-07-23 ENCOUNTER — PATIENT MESSAGE (OUTPATIENT)
Age: 34
End: 2024-07-23

## 2024-07-24 NOTE — TELEPHONE ENCOUNTER
From: Jeannine Tong  To: Dr. August Fernandes  Sent: 7/23/2024 2:39 PM EDT  Subject: Ultrasound    I forgot to ask if everything looked good on my 20 week ultrasound. Since I was scheduled wrong, we came the next day and did not see Dom afterwards so I just wanted to make sure all was good. I know previously my placenta was pretty low, did it move?

## 2024-08-20 ENCOUNTER — ROUTINE PRENATAL (OUTPATIENT)
Age: 34
End: 2024-08-20
Payer: COMMERCIAL

## 2024-08-20 VITALS — SYSTOLIC BLOOD PRESSURE: 124 MMHG | WEIGHT: 182.2 LBS | DIASTOLIC BLOOD PRESSURE: 82 MMHG | BODY MASS INDEX: 31.27 KG/M2

## 2024-08-20 DIAGNOSIS — Z34.82 ENCOUNTER FOR SUPERVISION OF OTHER NORMAL PREGNANCY, SECOND TRIMESTER: Primary | ICD-10-CM

## 2024-08-20 DIAGNOSIS — Z23 ENCOUNTER FOR IMMUNIZATION: ICD-10-CM

## 2024-08-20 DIAGNOSIS — Z34.80 ENCOUNTER FOR SUPERVISION OF OTHER NORMAL PREGNANCY, UNSPECIFIED TRIMESTER: ICD-10-CM

## 2024-08-20 PROCEDURE — 90715 TDAP VACCINE 7 YRS/> IM: CPT | Performed by: OBSTETRICS & GYNECOLOGY

## 2024-08-20 PROCEDURE — 0502F SUBSEQUENT PRENATAL CARE: CPT | Performed by: OBSTETRICS & GYNECOLOGY

## 2024-08-20 PROCEDURE — 90471 IMMUNIZATION ADMIN: CPT | Performed by: OBSTETRICS & GYNECOLOGY

## 2024-08-21 LAB
BASOPHILS # BLD AUTO: 0 X10E3/UL (ref 0–0.2)
BASOPHILS NFR BLD AUTO: 0 %
EOSINOPHIL # BLD AUTO: 0.1 X10E3/UL (ref 0–0.4)
EOSINOPHIL NFR BLD AUTO: 1 %
ERYTHROCYTE [DISTWIDTH] IN BLOOD BY AUTOMATED COUNT: 12.9 % (ref 11.7–15.4)
GLUCOSE 1H P 50 G GLC PO SERPL-MCNC: 108 MG/DL (ref 70–139)
HCT VFR BLD AUTO: 35.7 % (ref 34–46.6)
HGB BLD-MCNC: 11.7 G/DL (ref 11.1–15.9)
IMM GRANULOCYTES # BLD AUTO: 0 X10E3/UL (ref 0–0.1)
IMM GRANULOCYTES NFR BLD AUTO: 1 %
LYMPHOCYTES # BLD AUTO: 0.9 X10E3/UL (ref 0.7–3.1)
LYMPHOCYTES NFR BLD AUTO: 14 %
MCH RBC QN AUTO: 28.5 PG (ref 26.6–33)
MCHC RBC AUTO-ENTMCNC: 32.8 G/DL (ref 31.5–35.7)
MCV RBC AUTO: 87 FL (ref 79–97)
MONOCYTES # BLD AUTO: 0.3 X10E3/UL (ref 0.1–0.9)
MONOCYTES NFR BLD AUTO: 4 %
NEUTROPHILS # BLD AUTO: 5 X10E3/UL (ref 1.4–7)
NEUTROPHILS NFR BLD AUTO: 80 %
PLATELET # BLD AUTO: 176 X10E3/UL (ref 150–450)
RBC # BLD AUTO: 4.11 X10E6/UL (ref 3.77–5.28)
WBC # BLD AUTO: 6.3 X10E3/UL (ref 3.4–10.8)

## 2024-09-16 ENCOUNTER — ROUTINE PRENATAL (OUTPATIENT)
Age: 34
End: 2024-09-16

## 2024-09-16 VITALS — DIASTOLIC BLOOD PRESSURE: 78 MMHG | WEIGHT: 186.4 LBS | SYSTOLIC BLOOD PRESSURE: 114 MMHG | BODY MASS INDEX: 32 KG/M2

## 2024-09-16 DIAGNOSIS — Z34.80 ENCOUNTER FOR SUPERVISION OF OTHER NORMAL PREGNANCY, UNSPECIFIED TRIMESTER: Primary | ICD-10-CM

## 2024-09-16 DIAGNOSIS — Z34.90 ENCOUNTER FOR SUPERVISION OF NORMAL PREGNANCY, ANTEPARTUM, UNSPECIFIED GRAVIDITY: Primary | ICD-10-CM

## 2024-09-16 PROCEDURE — 0502F SUBSEQUENT PRENATAL CARE: CPT | Performed by: OBSTETRICS & GYNECOLOGY

## 2024-09-30 ENCOUNTER — ROUTINE PRENATAL (OUTPATIENT)
Age: 34
End: 2024-09-30

## 2024-09-30 VITALS — BODY MASS INDEX: 32.37 KG/M2 | DIASTOLIC BLOOD PRESSURE: 80 MMHG | WEIGHT: 188.6 LBS | SYSTOLIC BLOOD PRESSURE: 115 MMHG

## 2024-09-30 DIAGNOSIS — Z34.80 ENCOUNTER FOR SUPERVISION OF OTHER NORMAL PREGNANCY, UNSPECIFIED TRIMESTER: Primary | ICD-10-CM

## 2024-09-30 PROCEDURE — 0502F SUBSEQUENT PRENATAL CARE: CPT | Performed by: OBSTETRICS & GYNECOLOGY

## 2024-10-11 ENCOUNTER — ROUTINE PRENATAL (OUTPATIENT)
Age: 34
End: 2024-10-11

## 2024-10-11 VITALS — BODY MASS INDEX: 32.34 KG/M2 | WEIGHT: 188.4 LBS | SYSTOLIC BLOOD PRESSURE: 131 MMHG | DIASTOLIC BLOOD PRESSURE: 85 MMHG

## 2024-10-11 DIAGNOSIS — Z34.80 ENCOUNTER FOR SUPERVISION OF OTHER NORMAL PREGNANCY, UNSPECIFIED TRIMESTER: Primary | ICD-10-CM

## 2024-10-11 RX ORDER — RESPIRATORY SYNCYTIAL VIRUS VACCINE 120MCG/0.5
0.5 KIT INTRAMUSCULAR ONCE
Qty: 0.5 ML | Refills: 0 | Status: SHIPPED | OUTPATIENT
Start: 2024-10-11 | End: 2024-10-11

## 2024-10-11 NOTE — PROGRESS NOTES
GBS done  Discussed Flu, Covid, and RSV vaccines.  Will have her go to [facility] health downstairs for Flu and RSV.  Not currently a solid recommendation for Covid during pregnancy.

## 2024-10-14 LAB — GBS, EXTERNAL RESULT: NORMAL

## 2024-10-16 LAB
GBS, EXTERNAL RESULT: POSITIVE
GP B STREP DNA SPEC QL NAA+PROBE: POSITIVE

## 2024-10-22 ENCOUNTER — PATIENT MESSAGE (OUTPATIENT)
Age: 34
End: 2024-10-22

## 2024-10-22 RX ORDER — HYDROCORTISONE 25 MG/G
CREAM TOPICAL
Qty: 28 G | Refills: 2 | Status: SHIPPED | OUTPATIENT
Start: 2024-10-22

## 2024-10-23 ENCOUNTER — ROUTINE PRENATAL (OUTPATIENT)
Age: 34
End: 2024-10-23

## 2024-10-23 VITALS
WEIGHT: 192.4 LBS | DIASTOLIC BLOOD PRESSURE: 82 MMHG | SYSTOLIC BLOOD PRESSURE: 132 MMHG | HEART RATE: 67 BPM | OXYGEN SATURATION: 98 % | BODY MASS INDEX: 33.03 KG/M2

## 2024-10-23 DIAGNOSIS — Z34.80 ENCOUNTER FOR SUPERVISION OF OTHER NORMAL PREGNANCY, UNSPECIFIED TRIMESTER: Primary | ICD-10-CM

## 2024-10-23 PROCEDURE — 0502F SUBSEQUENT PRENATAL CARE: CPT | Performed by: OBSTETRICS & GYNECOLOGY

## 2024-10-30 ENCOUNTER — ROUTINE PRENATAL (OUTPATIENT)
Age: 34
End: 2024-10-30

## 2024-10-30 VITALS — BODY MASS INDEX: 33.06 KG/M2 | DIASTOLIC BLOOD PRESSURE: 83 MMHG | SYSTOLIC BLOOD PRESSURE: 124 MMHG | WEIGHT: 192.6 LBS

## 2024-10-30 DIAGNOSIS — Z34.80 ENCOUNTER FOR SUPERVISION OF OTHER NORMAL PREGNANCY, UNSPECIFIED TRIMESTER: Primary | ICD-10-CM

## 2024-10-30 PROCEDURE — 0502F SUBSEQUENT PRENATAL CARE: CPT | Performed by: OBSTETRICS & GYNECOLOGY

## 2024-11-06 ENCOUNTER — ROUTINE PRENATAL (OUTPATIENT)
Age: 34
End: 2024-11-06

## 2024-11-06 VITALS
DIASTOLIC BLOOD PRESSURE: 79 MMHG | HEART RATE: 85 BPM | SYSTOLIC BLOOD PRESSURE: 132 MMHG | WEIGHT: 192.8 LBS | BODY MASS INDEX: 33.09 KG/M2

## 2024-11-06 DIAGNOSIS — Z34.80 ENCOUNTER FOR SUPERVISION OF OTHER NORMAL PREGNANCY, UNSPECIFIED TRIMESTER: Primary | ICD-10-CM

## 2024-11-06 PROCEDURE — 0502F SUBSEQUENT PRENATAL CARE: CPT | Performed by: OBSTETRICS & GYNECOLOGY

## 2024-11-12 ENCOUNTER — ROUTINE PRENATAL (OUTPATIENT)
Age: 34
End: 2024-11-12
Payer: COMMERCIAL

## 2024-11-12 ENCOUNTER — HOSPITAL ENCOUNTER (INPATIENT)
Facility: HOSPITAL | Age: 34
LOS: 2 days | Discharge: HOME | End: 2024-11-14
Attending: OBSTETRICS & GYNECOLOGY | Admitting: OBSTETRICS & GYNECOLOGY
Payer: COMMERCIAL

## 2024-11-12 ENCOUNTER — PATIENT MESSAGE (OUTPATIENT)
Age: 34
End: 2024-11-12

## 2024-11-12 VITALS
BODY MASS INDEX: 33.47 KG/M2 | HEART RATE: 79 BPM | DIASTOLIC BLOOD PRESSURE: 104 MMHG | SYSTOLIC BLOOD PRESSURE: 146 MMHG | WEIGHT: 195 LBS

## 2024-11-12 DIAGNOSIS — Z3A.40 40 WEEKS GESTATION OF PREGNANCY: Primary | ICD-10-CM

## 2024-11-12 DIAGNOSIS — O16.3 HYPERTENSION AFFECTING PREGNANCY IN THIRD TRIMESTER: ICD-10-CM

## 2024-11-12 LAB
ABO + RH BLD: NORMAL
ALBUMIN SERPL-MCNC: 2.9 G/DL (ref 3.5–5)
ALBUMIN/GLOB SERPL: 0.8 (ref 1.1–2.2)
ALP SERPL-CCNC: 150 U/L (ref 45–117)
ALT SERPL-CCNC: 15 U/L (ref 12–78)
ANION GAP SERPL CALC-SCNC: 7 MMOL/L (ref 2–12)
AST SERPL-CCNC: 17 U/L (ref 15–37)
BILIRUB SERPL-MCNC: 0.3 MG/DL (ref 0.2–1)
BLOOD GROUP ANTIBODIES SERPL: NORMAL
BUN SERPL-MCNC: 13 MG/DL (ref 6–20)
BUN/CREAT SERPL: 17 (ref 12–20)
CALCIUM SERPL-MCNC: 8.9 MG/DL (ref 8.5–10.1)
CHLORIDE SERPL-SCNC: 106 MMOL/L (ref 97–108)
CO2 SERPL-SCNC: 23 MMOL/L (ref 21–32)
CREAT SERPL-MCNC: 0.78 MG/DL (ref 0.55–1.02)
CREAT UR-MCNC: 27 MG/DL
ERYTHROCYTE [DISTWIDTH] IN BLOOD BY AUTOMATED COUNT: 13.4 % (ref 11.5–14.5)
GLOBULIN SER CALC-MCNC: 3.8 G/DL (ref 2–4)
GLUCOSE SERPL-MCNC: 76 MG/DL (ref 65–100)
HCT VFR BLD AUTO: 36.4 % (ref 35–47)
HGB BLD-MCNC: 12.3 G/DL (ref 11.5–16)
MCH RBC QN AUTO: 28.4 PG (ref 26–34)
MCHC RBC AUTO-ENTMCNC: 33.8 G/DL (ref 30–36.5)
MCV RBC AUTO: 84.1 FL (ref 80–99)
NRBC # BLD: 0 K/UL (ref 0–0.01)
NRBC BLD-RTO: 0 PER 100 WBC
PLATELET # BLD AUTO: 160 K/UL (ref 150–400)
PMV BLD AUTO: 12.9 FL (ref 8.9–12.9)
POTASSIUM SERPL-SCNC: 4.1 MMOL/L (ref 3.5–5.1)
PROT SERPL-MCNC: 6.7 G/DL (ref 6.4–8.2)
PROT UR-MCNC: <5 MG/DL (ref 0–11.9)
PROT/CREAT UR-RTO: <0.2
RBC # BLD AUTO: 4.33 M/UL (ref 3.8–5.2)
SODIUM SERPL-SCNC: 136 MMOL/L (ref 136–145)
SPECIMEN EXP DATE BLD: NORMAL
WBC # BLD AUTO: 8.3 K/UL (ref 3.6–11)

## 2024-11-12 PROCEDURE — 7210000100 HC LABOR FEE PER 1 HR: Performed by: ADVANCED PRACTICE MIDWIFE

## 2024-11-12 PROCEDURE — 85027 COMPLETE CBC AUTOMATED: CPT

## 2024-11-12 PROCEDURE — 82570 ASSAY OF URINE CREATININE: CPT

## 2024-11-12 PROCEDURE — 59200 INSERT CERVICAL DILATOR: CPT | Performed by: ADVANCED PRACTICE MIDWIFE

## 2024-11-12 PROCEDURE — 2580000003 HC RX 258: Performed by: OBSTETRICS & GYNECOLOGY

## 2024-11-12 PROCEDURE — 86900 BLOOD TYPING SEROLOGIC ABO: CPT

## 2024-11-12 PROCEDURE — 86901 BLOOD TYPING SEROLOGIC RH(D): CPT

## 2024-11-12 PROCEDURE — 36415 COLL VENOUS BLD VENIPUNCTURE: CPT

## 2024-11-12 PROCEDURE — 1100000000 HC RM PRIVATE

## 2024-11-12 PROCEDURE — 6360000002 HC RX W HCPCS: Performed by: OBSTETRICS & GYNECOLOGY

## 2024-11-12 PROCEDURE — 0502F SUBSEQUENT PRENATAL CARE: CPT | Performed by: STUDENT IN AN ORGANIZED HEALTH CARE EDUCATION/TRAINING PROGRAM

## 2024-11-12 PROCEDURE — 80053 COMPREHEN METABOLIC PANEL: CPT

## 2024-11-12 PROCEDURE — 6370000000 HC RX 637 (ALT 250 FOR IP): Performed by: STUDENT IN AN ORGANIZED HEALTH CARE EDUCATION/TRAINING PROGRAM

## 2024-11-12 PROCEDURE — 86850 RBC ANTIBODY SCREEN: CPT

## 2024-11-12 PROCEDURE — 59200 INSERT CERVICAL DILATOR: CPT | Performed by: STUDENT IN AN ORGANIZED HEALTH CARE EDUCATION/TRAINING PROGRAM

## 2024-11-12 PROCEDURE — 84156 ASSAY OF PROTEIN URINE: CPT

## 2024-11-12 PROCEDURE — 86780 TREPONEMA PALLIDUM: CPT

## 2024-11-12 RX ORDER — SODIUM CHLORIDE, SODIUM LACTATE, POTASSIUM CHLORIDE, AND CALCIUM CHLORIDE .6; .31; .03; .02 G/100ML; G/100ML; G/100ML; G/100ML
500 INJECTION, SOLUTION INTRAVENOUS PRN
Status: DISCONTINUED | OUTPATIENT
Start: 2024-11-12 | End: 2024-11-14 | Stop reason: HOSPADM

## 2024-11-12 RX ORDER — METHYLERGONOVINE MALEATE 0.2 MG/ML
200 INJECTION INTRAVENOUS PRN
Status: DISCONTINUED | OUTPATIENT
Start: 2024-11-12 | End: 2024-11-14 | Stop reason: HOSPADM

## 2024-11-12 RX ORDER — SODIUM CHLORIDE 0.9 % (FLUSH) 0.9 %
5-40 SYRINGE (ML) INJECTION PRN
Status: DISCONTINUED | OUTPATIENT
Start: 2024-11-12 | End: 2024-11-13

## 2024-11-12 RX ORDER — PROCHLORPERAZINE EDISYLATE 5 MG/ML
5 INJECTION INTRAMUSCULAR; INTRAVENOUS EVERY 6 HOURS PRN
Status: DISCONTINUED | OUTPATIENT
Start: 2024-11-12 | End: 2024-11-13

## 2024-11-12 RX ORDER — HYDROMORPHONE HYDROCHLORIDE 1 MG/ML
1 INJECTION, SOLUTION INTRAMUSCULAR; INTRAVENOUS; SUBCUTANEOUS
Status: DISCONTINUED | OUTPATIENT
Start: 2024-11-12 | End: 2024-11-14 | Stop reason: HOSPADM

## 2024-11-12 RX ORDER — SODIUM CHLORIDE, SODIUM LACTATE, POTASSIUM CHLORIDE, CALCIUM CHLORIDE 600; 310; 30; 20 MG/100ML; MG/100ML; MG/100ML; MG/100ML
INJECTION, SOLUTION INTRAVENOUS CONTINUOUS
Status: DISCONTINUED | OUTPATIENT
Start: 2024-11-12 | End: 2024-11-13

## 2024-11-12 RX ORDER — TRANEXAMIC ACID 10 MG/ML
1000 INJECTION, SOLUTION INTRAVENOUS
Status: ACTIVE | OUTPATIENT
Start: 2024-11-12 | End: 2024-11-13

## 2024-11-12 RX ORDER — TERBUTALINE SULFATE 1 MG/ML
0.25 INJECTION, SOLUTION SUBCUTANEOUS
Status: DISCONTINUED | OUTPATIENT
Start: 2024-11-12 | End: 2024-11-13

## 2024-11-12 RX ORDER — CARBOPROST TROMETHAMINE 250 UG/ML
250 INJECTION, SOLUTION INTRAMUSCULAR PRN
Status: DISCONTINUED | OUTPATIENT
Start: 2024-11-12 | End: 2024-11-14 | Stop reason: HOSPADM

## 2024-11-12 RX ORDER — ONDANSETRON 2 MG/ML
4 INJECTION INTRAMUSCULAR; INTRAVENOUS EVERY 6 HOURS PRN
Status: DISCONTINUED | OUTPATIENT
Start: 2024-11-12 | End: 2024-11-14 | Stop reason: HOSPADM

## 2024-11-12 RX ORDER — SODIUM CHLORIDE 0.9 % (FLUSH) 0.9 %
5-40 SYRINGE (ML) INJECTION EVERY 12 HOURS SCHEDULED
Status: DISCONTINUED | OUTPATIENT
Start: 2024-11-12 | End: 2024-11-14 | Stop reason: HOSPADM

## 2024-11-12 RX ORDER — MISOPROSTOL 200 UG/1
400 TABLET ORAL PRN
Status: DISCONTINUED | OUTPATIENT
Start: 2024-11-12 | End: 2024-11-14 | Stop reason: HOSPADM

## 2024-11-12 RX ORDER — SODIUM CHLORIDE 9 MG/ML
25 INJECTION, SOLUTION INTRAVENOUS PRN
Status: DISCONTINUED | OUTPATIENT
Start: 2024-11-12 | End: 2024-11-14 | Stop reason: HOSPADM

## 2024-11-12 RX ORDER — ONDANSETRON 4 MG/1
4 TABLET, ORALLY DISINTEGRATING ORAL EVERY 6 HOURS PRN
Status: DISCONTINUED | OUTPATIENT
Start: 2024-11-12 | End: 2024-11-14 | Stop reason: HOSPADM

## 2024-11-12 RX ORDER — DOCUSATE SODIUM 100 MG/1
100 CAPSULE, LIQUID FILLED ORAL 2 TIMES DAILY
Status: DISCONTINUED | OUTPATIENT
Start: 2024-11-12 | End: 2024-11-12

## 2024-11-12 RX ADMIN — SODIUM CHLORIDE, POTASSIUM CHLORIDE, SODIUM LACTATE AND CALCIUM CHLORIDE: 600; 310; 30; 20 INJECTION, SOLUTION INTRAVENOUS at 18:33

## 2024-11-12 RX ADMIN — Medication 25 MCG: at 20:34

## 2024-11-12 RX ADMIN — PENICILLIN G POTASSIUM 5 MILLION UNITS: 5000000 INJECTION, POWDER, FOR SOLUTION INTRAMUSCULAR; INTRAVENOUS at 18:35

## 2024-11-12 NOTE — PROGRESS NOTES
Cervical Catheter insertion procedure note    Jeannine Tong is a ,  34 y.o. female who presents today far placement of a cervical catheter in the cervix for ripening. Her cervix is unfavorable and she is scheduled for induction tomorrow.   She has elected to have a cervical catheter placement today. The risks, benefits and assets of the procedure were discussed. Her questions were answered. FHR was 140 bpm prior to placement and bedside ultrasound confirmed vertex presentation.  PROCEDURE: Cervix was examined and found to be 1/L/H. A Cook catheter was placed through the cervix without difficulty. The uterine bulb was inflated with 80 cc of saline. The cervical balloon was inflated to 60 cc of saline.  Bleeding was minimal. The patient's level of discomfort was minimal.   POST PROCEDURE: The patient tolerated the procedure well. There were no complications.   She was admitted as an outpatient for monitoring overnight.      35yo  at 40w4d here scheduled IOL. BP on arrival severely elevated to 166/90, on repeat 146/104. On chart review, elevated BP during routine care at 39w5d. Patient specifically denies HA, vision changes, SOB, RUQ pain. Lungs CTA BL, no RUQ tenderness.    A/P  Patient advised of elevated Bps and concern for gHTN vs preE vs severe preE. No sxs. TO L&D with greer bulb in place for IOL. Will treat with mag and PRN antihypertensives as indicated.     MD Juancarlos Lanza OB/Gyn

## 2024-11-13 ENCOUNTER — ANESTHESIA EVENT (OUTPATIENT)
Facility: HOSPITAL | Age: 34
End: 2024-11-13
Payer: COMMERCIAL

## 2024-11-13 ENCOUNTER — ANESTHESIA (OUTPATIENT)
Facility: HOSPITAL | Age: 34
End: 2024-11-13
Payer: COMMERCIAL

## 2024-11-13 PROCEDURE — 6360000002 HC RX W HCPCS: Performed by: NURSE ANESTHETIST, CERTIFIED REGISTERED

## 2024-11-13 PROCEDURE — 7210000100 HC LABOR FEE PER 1 HR: Performed by: ADVANCED PRACTICE MIDWIFE

## 2024-11-13 PROCEDURE — 2500000003 HC RX 250 WO HCPCS: Performed by: NURSE ANESTHETIST, CERTIFIED REGISTERED

## 2024-11-13 PROCEDURE — 10907ZC DRAINAGE OF AMNIOTIC FLUID, THERAPEUTIC FROM PRODUCTS OF CONCEPTION, VIA NATURAL OR ARTIFICIAL OPENING: ICD-10-PCS | Performed by: OBSTETRICS & GYNECOLOGY

## 2024-11-13 PROCEDURE — 10H073Z INSERTION OF MONITORING ELECTRODE INTO PRODUCTS OF CONCEPTION, VIA NATURAL OR ARTIFICIAL OPENING: ICD-10-PCS | Performed by: OBSTETRICS & GYNECOLOGY

## 2024-11-13 PROCEDURE — 3E033VJ INTRODUCTION OF OTHER HORMONE INTO PERIPHERAL VEIN, PERCUTANEOUS APPROACH: ICD-10-PCS | Performed by: OBSTETRICS & GYNECOLOGY

## 2024-11-13 PROCEDURE — 3700000156 HC EPIDURAL ANESTHESIA: Performed by: NURSE ANESTHETIST, CERTIFIED REGISTERED

## 2024-11-13 PROCEDURE — 6370000000 HC RX 637 (ALT 250 FOR IP): Performed by: ADVANCED PRACTICE MIDWIFE

## 2024-11-13 PROCEDURE — 2580000003 HC RX 258: Performed by: OBSTETRICS & GYNECOLOGY

## 2024-11-13 PROCEDURE — 6360000002 HC RX W HCPCS: Performed by: OBSTETRICS & GYNECOLOGY

## 2024-11-13 PROCEDURE — 00HU33Z INSERTION OF INFUSION DEVICE INTO SPINAL CANAL, PERCUTANEOUS APPROACH: ICD-10-PCS | Performed by: ANESTHESIOLOGY

## 2024-11-13 PROCEDURE — 3700000025 EPIDURAL BLOCK: Performed by: ANESTHESIOLOGY

## 2024-11-13 PROCEDURE — 94761 N-INVAS EAR/PLS OXIMETRY MLT: CPT

## 2024-11-13 PROCEDURE — 3E0P7VZ INTRODUCTION OF HORMONE INTO FEMALE REPRODUCTIVE, VIA NATURAL OR ARTIFICIAL OPENING: ICD-10-PCS | Performed by: OBSTETRICS & GYNECOLOGY

## 2024-11-13 PROCEDURE — 0HQ9XZZ REPAIR PERINEUM SKIN, EXTERNAL APPROACH: ICD-10-PCS | Performed by: OBSTETRICS & GYNECOLOGY

## 2024-11-13 PROCEDURE — 4A1H74Z MONITORING OF PRODUCTS OF CONCEPTION, CARDIAC ELECTRICAL ACTIVITY, VIA NATURAL OR ARTIFICIAL OPENING: ICD-10-PCS | Performed by: OBSTETRICS & GYNECOLOGY

## 2024-11-13 PROCEDURE — 4A1HXCZ MONITORING OF PRODUCTS OF CONCEPTION, CARDIAC RATE, EXTERNAL APPROACH: ICD-10-PCS | Performed by: OBSTETRICS & GYNECOLOGY

## 2024-11-13 PROCEDURE — 6370000000 HC RX 637 (ALT 250 FOR IP): Performed by: OBSTETRICS & GYNECOLOGY

## 2024-11-13 PROCEDURE — 7220000101 HC DELIVERY VAGINAL/SINGLE: Performed by: ADVANCED PRACTICE MIDWIFE

## 2024-11-13 PROCEDURE — 1120000000 HC RM PRIVATE OB

## 2024-11-13 PROCEDURE — 6370000000 HC RX 637 (ALT 250 FOR IP): Performed by: STUDENT IN AN ORGANIZED HEALTH CARE EDUCATION/TRAINING PROGRAM

## 2024-11-13 RX ORDER — FENTANYL/BUPIVACAINE/NS/PF 2-1250MCG
10 PLASTIC BAG, INJECTION (ML) INJECTION CONTINUOUS
Status: DISCONTINUED | OUTPATIENT
Start: 2024-11-13 | End: 2024-11-13

## 2024-11-13 RX ORDER — ACETAMINOPHEN 500 MG
1000 TABLET ORAL EVERY 8 HOURS SCHEDULED
Status: DISCONTINUED | OUTPATIENT
Start: 2024-11-13 | End: 2024-11-14 | Stop reason: HOSPADM

## 2024-11-13 RX ORDER — LIDOCAINE HYDROCHLORIDE AND EPINEPHRINE 15; 5 MG/ML; UG/ML
INJECTION, SOLUTION EPIDURAL
Status: DISCONTINUED | OUTPATIENT
Start: 2024-11-13 | End: 2024-11-13 | Stop reason: SDUPTHER

## 2024-11-13 RX ORDER — LABETALOL 200 MG/1
200 TABLET, FILM COATED ORAL 2 TIMES DAILY
Qty: 60 TABLET | Refills: 3 | OUTPATIENT
Start: 2024-11-14

## 2024-11-13 RX ORDER — SODIUM CHLORIDE 9 MG/ML
INJECTION, SOLUTION INTRAVENOUS PRN
Status: DISCONTINUED | OUTPATIENT
Start: 2024-11-13 | End: 2024-11-14 | Stop reason: HOSPADM

## 2024-11-13 RX ORDER — IBUPROFEN 800 MG/1
800 TABLET, FILM COATED ORAL EVERY 8 HOURS SCHEDULED
Status: DISCONTINUED | OUTPATIENT
Start: 2024-11-13 | End: 2024-11-14 | Stop reason: HOSPADM

## 2024-11-13 RX ORDER — ONDANSETRON 2 MG/ML
4 INJECTION INTRAMUSCULAR; INTRAVENOUS EVERY 6 HOURS PRN
Status: DISCONTINUED | OUTPATIENT
Start: 2024-11-13 | End: 2024-11-13

## 2024-11-13 RX ORDER — EPHEDRINE SULFATE/0.9% NACL/PF 25 MG/5 ML
5 SYRINGE (ML) INTRAVENOUS PRN
Status: DISCONTINUED | OUTPATIENT
Start: 2024-11-14 | End: 2024-11-13

## 2024-11-13 RX ORDER — SODIUM CHLORIDE 0.9 % (FLUSH) 0.9 %
5-40 SYRINGE (ML) INJECTION PRN
Status: DISCONTINUED | OUTPATIENT
Start: 2024-11-13 | End: 2024-11-14 | Stop reason: HOSPADM

## 2024-11-13 RX ORDER — BUPIVACAINE HYDROCHLORIDE 2.5 MG/ML
INJECTION, SOLUTION EPIDURAL; INFILTRATION; INTRACAUDAL
Status: DISCONTINUED | OUTPATIENT
Start: 2024-11-13 | End: 2024-11-13 | Stop reason: SDUPTHER

## 2024-11-13 RX ORDER — DOCUSATE SODIUM 100 MG/1
100 CAPSULE, LIQUID FILLED ORAL 2 TIMES DAILY
Status: DISCONTINUED | OUTPATIENT
Start: 2024-11-13 | End: 2024-11-14 | Stop reason: HOSPADM

## 2024-11-13 RX ORDER — EPHEDRINE SULFATE/0.9% NACL/PF 25 MG/5 ML
10 SYRINGE (ML) INTRAVENOUS
Status: DISCONTINUED | OUTPATIENT
Start: 2024-11-13 | End: 2024-11-13

## 2024-11-13 RX ORDER — ONDANSETRON 2 MG/ML
4 INJECTION INTRAMUSCULAR; INTRAVENOUS EVERY 6 HOURS PRN
Status: DISCONTINUED | OUTPATIENT
Start: 2024-11-13 | End: 2024-11-14 | Stop reason: HOSPADM

## 2024-11-13 RX ORDER — LABETALOL 200 MG/1
200 TABLET, FILM COATED ORAL 2 TIMES DAILY
Status: DISCONTINUED | OUTPATIENT
Start: 2024-11-13 | End: 2024-11-14 | Stop reason: HOSPADM

## 2024-11-13 RX ORDER — ONDANSETRON 4 MG/1
4 TABLET, ORALLY DISINTEGRATING ORAL EVERY 6 HOURS PRN
Status: DISCONTINUED | OUTPATIENT
Start: 2024-11-13 | End: 2024-11-14 | Stop reason: HOSPADM

## 2024-11-13 RX ORDER — SODIUM CHLORIDE 0.9 % (FLUSH) 0.9 %
5-40 SYRINGE (ML) INJECTION EVERY 12 HOURS SCHEDULED
Status: DISCONTINUED | OUTPATIENT
Start: 2024-11-13 | End: 2024-11-14 | Stop reason: HOSPADM

## 2024-11-13 RX ORDER — NALOXONE HYDROCHLORIDE 0.4 MG/ML
INJECTION, SOLUTION INTRAMUSCULAR; INTRAVENOUS; SUBCUTANEOUS PRN
Status: DISCONTINUED | OUTPATIENT
Start: 2024-11-13 | End: 2024-11-13

## 2024-11-13 RX ADMIN — LIDOCAINE HYDROCHLORIDE AND EPINEPHRINE 3 ML: 15; 5 INJECTION, SOLUTION EPIDURAL at 03:34

## 2024-11-13 RX ADMIN — Medication 999 MILLI-UNITS/MIN: at 06:30

## 2024-11-13 RX ADMIN — DOCUSATE SODIUM 100 MG: 100 CAPSULE, LIQUID FILLED ORAL at 07:23

## 2024-11-13 RX ADMIN — SODIUM CHLORIDE 2.5 MILLION UNITS: 9 INJECTION, SOLUTION INTRAVENOUS at 04:59

## 2024-11-13 RX ADMIN — LIDOCAINE HYDROCHLORIDE AND EPINEPHRINE 2 ML: 15; 5 INJECTION, SOLUTION EPIDURAL at 03:39

## 2024-11-13 RX ADMIN — Medication 10 ML/HR: at 03:47

## 2024-11-13 RX ADMIN — IBUPROFEN 800 MG: 800 TABLET, FILM COATED ORAL at 07:23

## 2024-11-13 RX ADMIN — Medication 25 MCG: at 01:00

## 2024-11-13 RX ADMIN — SODIUM CHLORIDE, POTASSIUM CHLORIDE, SODIUM LACTATE AND CALCIUM CHLORIDE: 600; 310; 30; 20 INJECTION, SOLUTION INTRAVENOUS at 03:42

## 2024-11-13 RX ADMIN — BUPIVACAINE HYDROCHLORIDE 5 ML: 2.5 INJECTION, SOLUTION EPIDURAL; INFILTRATION; INTRACAUDAL; PERINEURAL at 03:39

## 2024-11-13 RX ADMIN — SODIUM CHLORIDE 2.5 MILLION UNITS: 9 INJECTION, SOLUTION INTRAVENOUS at 01:00

## 2024-11-13 RX ADMIN — LABETALOL HYDROCHLORIDE 200 MG: 200 TABLET, FILM COATED ORAL at 15:58

## 2024-11-13 RX ADMIN — MISOPROSTOL 400 MCG: 200 TABLET ORAL at 06:35

## 2024-11-13 RX ADMIN — ACETAMINOPHEN 1000 MG: 500 TABLET ORAL at 07:23

## 2024-11-13 RX ADMIN — IBUPROFEN 800 MG: 800 TABLET, FILM COATED ORAL at 15:54

## 2024-11-13 ASSESSMENT — PAIN SCALES - GENERAL
PAINLEVEL_OUTOF10: 7
PAINLEVEL_OUTOF10: 4

## 2024-11-13 ASSESSMENT — PAIN DESCRIPTION - LOCATION
LOCATION: PERINEUM
LOCATION: BACK

## 2024-11-13 ASSESSMENT — PAIN - FUNCTIONAL ASSESSMENT
PAIN_FUNCTIONAL_ASSESSMENT: ACTIVITIES ARE NOT PREVENTED
PAIN_FUNCTIONAL_ASSESSMENT: ACTIVITIES ARE NOT PREVENTED

## 2024-11-13 ASSESSMENT — PAIN DESCRIPTION - ORIENTATION: ORIENTATION: LOWER

## 2024-11-13 ASSESSMENT — PAIN DESCRIPTION - DESCRIPTORS
DESCRIPTORS: BURNING;PRESSURE
DESCRIPTORS: ACHING;SORE

## 2024-11-13 NOTE — PROGRESS NOTES
0156: RN at pt bedside assessing CC out while pt using RR. Pt denies LOF, bleeding or pain and reports positive fetal movement.

## 2024-11-13 NOTE — PROGRESS NOTES
Timoteo served Dr. Fernandes to report sustained elevated BP. Order received for Labetalol 200mg BID.

## 2024-11-13 NOTE — H&P
History & Physical    Name: Jeannine Tong MRN: [id number]  SSN: [id number]    YOB: 1990  Age: 34 y.o.  Sex: female        Subjective:     Estimated Date of Delivery: 24  OB History    Para Term  AB Living   2   1 0 0 1   SAB IAB Ectopic Molar Multiple Live Births                    # Outcome Date GA Lbr Maximilian/2nd Weight Sex Type Anes PTL Lv   1 Current                  Ms. Tong who presents with pregnancy at 40w4d for induction of labor. Prenatal course was normal.   Scheduled for elective IOL today, upon arrival to clinic for placement of greer bulb, BP severely elevated to 166 systolic, repeat mildly elevated. Denies HA, vision changes, SOB, RUQ pain. No obstetric complaints. Please see prenatal records for details.    She is not anemic.    Past Medical History:   Diagnosis Date    Chlamydia     HPV vaccine counseling     Gardasil, 3/3 injections    Pap smear for cervical cancer screening 2013    negative     Past Surgical History:   Procedure Laterality Date    GYN  2020    Hysterosalpingogram    HEENT      wisdom teeth removed    TONSILLECTOMY      UPPER GASTROINTESTINAL ENDOSCOPY  2017     Social History     Occupational History    Not on file   Tobacco Use    Smoking status: Never    Smokeless tobacco: Never   Vaping Use    Vaping status: Never Used   Substance and Sexual Activity    Alcohol use: No     Alcohol/week: 0.0 standard drinks of alcohol    Drug use: No    Sexual activity: Yes     Partners: Male     Birth control/protection: None     Family History   Problem Relation Age of Onset    No Known Problems Father     No Known Problems Mother        No Known Allergies  Prior to Admission medications    Medication Sig Start Date End Date Taking? Authorizing Provider   Prenatal w/o A Vit-Fe Fum-FA (PRENATA PO) Take by mouth   Yes Damion Melton MD   Omega-3 Fatty Acids (FISH OIL) 300 MG CAPS Take by mouth   Yes Damion Melton MD   ascorbic acid

## 2024-11-13 NOTE — ANESTHESIA PROCEDURE NOTES
Epidural Block    Patient location during procedure: OB  Start time: 11/13/2024 3:27 AM  End time: 11/13/2024 3:39 AM  Reason for block: labor epidural  Staffing  Performed: resident/CRNA   Anesthesiologist: Александр Messer DO  Resident/CRNA: Paula Toelntino APRN - CRNA  Performed by: Paula Tolentino APRN - CRNA  Authorized by: Александр Messer DO    Epidural  Patient position: sitting  Prep: ChloraPrep  Patient monitoring: continuous pulse ox and frequent blood pressure checks  Approach: midline  Location: L3-4  Injection technique: JILLIAN saline  Provider prep: sterile gloves and mask  Needle  Needle type: Tuohy   Needle gauge: 17 G  Needle length: 3.5 in  Needle insertion depth: 6 cm  Catheter type: end hole  Catheter size: 19 G  Catheter at skin depth: 10 cm  Test dose: negativeCatheter Secured: tegaderm and tape  Assessment  Hemodynamics: stable  Attempts: 1  Outcomes: uncomplicated and patient tolerated procedure well  Preanesthetic Checklist  Completed: patient identified, IV checked, site marked, risks and benefits discussed, surgical/procedural consents, equipment checked, pre-op evaluation, timeout performed, anesthesia consent given, oxygen available, monitors applied/VS acknowledged, fire risk safety assessment completed and verbalized and blood product R/B/A discussed and consented

## 2024-11-13 NOTE — L&D DELIVERY NOTE
CNM Delivery Note     Patient: Jeannine Tong MRN: [id number]  SSN: [id number]    YOB: 1990  Age: 34 y.o.  Sex: female      Complete cervical dilation at 06:08 am. Maternal pushing is started at 06:10 am. Patient under epidural anesthesia in semi-fowlers position.   of a live Baby Girl at 06:25 am with Apgars 9 , 9 in  ELIZABETH position. Shoulders spontaneous, easily delivered with maternal effort. Vigorous  placed on maternal abdomen immediately following delivery. Umbilical cord cut after 3 minutes of life. Placenta and membranes spontaneous, intact, with 3 vessel cord via Good mechanism at 06:30 am. Fundus massaged to firm. Estimated blood loss 250 mL. Vagina and perineum inspected. Perineum intact. 1st degree vaginal and perineal laceration repaired with 3-0 vicryl rapide under  anesthesia.     Mother and infant stable, bonding, establishing breastfeeding.     IV Postpartum Pitocin is given, as well as Cytotec 400 mcg sublingual x 1 for PPH Prophylaxis.     Ran, Baby Girl Jeannine [[id number]]      Labor Events     Labor: No   Steroids: None  Cervical Ripening Date/Time:      Antibiotics Received during Labor: No  Rupture Identifier: Sac 1  Rupture Date/Time:  24 05:24:00   Rupture Type: AROM  Fluid Color: Clear  Fluid Odor: None  Induction: Cervical Ripening Balloon, Misoprostol  Augmentation: AROM  Labor Complications: None       Anesthesia    Method: Epidural       Labor Event Times      Labor onset date/time:        Dilation complete date/time:  24 06:08:00     Start pushing date/time:  2024 06:10:00   Decision date/time (emergent ):            Labor Length    2nd stage: 0h 17m  3rd stage: 0h 05m       Delivery Details      Delivery Date: 24 Delivery Time: 06:25:20   Delivery Type: Vaginal, Spontaneous              [place] Presentation    Presentation: Vertex  Position: Left  _: Occiput  _: Anterior       Shoulder Dystocia    Shoulder

## 2024-11-13 NOTE — PROGRESS NOTES
1659: Pt arrived to L&D unit room 201 for Iol due to GHTN.     1910: Bedside and Verbal shift change report given to KELLIE Manrique (oncoming nurse) by KELLIE Quintana (offgoing nurse). Report included the following information Nurse Handoff Report, Index, Adult Overview, Surgery Report, Intake/Output, MAR, and Recent Results.

## 2024-11-13 NOTE — ANESTHESIA PRE PROCEDURE
Department of Anesthesiology  Preprocedure Note       Name:  Jeannine Tong   Age:  34 y.o.  :  1990                                          MRN:  [id number]         Date:  2024      Surgeon: * No surgeons listed *    Procedure: * No procedures listed *    Medications prior to admission:   Prior to Admission medications    Medication Sig Start Date End Date Taking? Authorizing Provider   Prenatal w/o A Vit-Fe Fum-FA (PRENATA PO) Take by mouth   Yes Damion Melton MD   Omega-3 Fatty Acids (FISH OIL) 300 MG CAPS Take by mouth   Yes Damion Melton MD   ascorbic acid (VITAMIN C) 500 MG tablet Take 1 tablet by mouth daily   Yes Automatic Reconciliation, Ar   hydrocortisone (ANUSOL-HC) 2.5 % CREA rectal cream Use as directed per package 10/22/24   August Fernandes MD   calcium carbonate (OSCAL) 500 MG TABS tablet Take 1 tablet by mouth daily    ProviderDamion MD       Current medications:    Current Facility-Administered Medications   Medication Dose Route Frequency Provider Last Rate Last Admin    naloxone 0.4 mg in 10 mL sodium chloride syringe   IntraVENous PRN Paula Tolentino APRN - SHAHEED        ondansetron (ZOFRAN) injection 4 mg  4 mg IntraVENous Q6H PRN Paula Tolentino APRN - CRNA        ePHEDrine injection 10 mg  10 mg IntraVENous Once PRN Paula Tolentino APRN - CRNA        Followed by    [START ON 2024] ePHEDrine injection 5 mg  5 mg IntraVENous PRN Paula Tolentino APRN - CRNA        fentaNYL 2 mcg/mL BUPivacaine 0.125% in sodium chloride 0.9% 100 mL epidural infusion  10 mL/hr Epidural Continuous Paula Tolentino APRN - CRNA        lactated ringers infusion   IntraVENous Continuous August Fernandes  mL/hr at 24 0342 New Bag at 24 0342    lactated ringers bolus 500 mL  500 mL IntraVENous PRN August Fernandes MD        Or    lactated ringers bolus 500 mL  500 mL IntraVENous

## 2024-11-13 NOTE — PROGRESS NOTES
Post-Partum Day Number 0    Progress note post vaginal delivery    Pt has no unusual postpartum complaints.  Pain is well controlled with current medications.  The baby is doing well.    Urinary output is adequate. Voiding without difficulty. The patient is ambulating well.  Tolerating a regular diet.     Vitals:  Patient Vitals for the past 8 hrs:   BP Temp Temp src Pulse Resp SpO2   24 1543 (!) 151/89 -- -- 88 -- --   24 1505 (!) 157/96 97.9 °F (36.6 °C) Oral 77 16 98 %   24 1051 (!) 153/80 99.5 °F (37.5 °C) Oral 98 16 100 %   24 0836 (!) 150/81 -- -- 86 -- --     Temp (24hrs), Av.4 °F (36.9 °C), Min:97.8 °F (36.6 °C), Max:99.5 °F (37.5 °C)      Exam:  Patient without distress.               Abdomen soft, fundus firm,  nontender               Perineum with normal lochia noted.               Lower extremities are negative for swelling, cords or tenderness.    Labs:   Recent Results (from the past 24 hour(s))   Protein / creatinine ratio, urine    Collection Time: 24  5:43 PM   Result Value Ref Range    Protein, Urine, Random <5 0.0 - 11.9 mg/dL    Creatinine, Ur 27.00 mg/dL    PROTEIN/CREAT RATIO URINE RAN <0.2    Comprehensive metabolic panel    Collection Time: 24  5:43 PM   Result Value Ref Range    Sodium 136 136 - 145 mmol/L    Potassium 4.1 3.5 - 5.1 mmol/L    Chloride 106 97 - 108 mmol/L    CO2 23 21 - 32 mmol/L    Anion Gap 7 2 - 12 mmol/L    Glucose 76 65 - 100 mg/dL    BUN 13 6 - 20 MG/DL    Creatinine 0.78 0.55 - 1.02 MG/DL    BUN/Creatinine Ratio 17 12 - 20      Est, Glom Filt Rate >90 >60 ml/min/1.73m2    Calcium 8.9 8.5 - 10.1 MG/DL    Total Bilirubin 0.3 0.2 - 1.0 MG/DL    ALT 15 12 - 78 U/L    AST 17 15 - 37 U/L    Alk Phosphatase 150 (H) 45 - 117 U/L    Total Protein 6.7 6.4 - 8.2 g/dL    Albumin 2.9 (L) 3.5 - 5.0 g/dL    Globulin 3.8 2.0 - 4.0 g/dL    Albumin/Globulin Ratio 0.8 (L) 1.1 - 2.2     CBC    Collection Time: 24  5:43 PM   Result Value Ref

## 2024-11-13 NOTE — DISCHARGE INSTRUCTIONS
POST VAGINAL DELIVERY DISCHARGE INSTRUCTIONS    Name: Jeannine Tong  YOB: 1990  Primary Diagnosis: [unfilled]    General:     Diet/Diet Restrictions:  Drink plenty of fluids daily (water, juices); avoid excessive caffeine intake.  Eat and drink your normal diet.    Medications:   See list    Physical Activity / Restrictions / Safety:     Avoid heavy lifting, no more that 15 lbs. For 2-3 weeks; may use of stairs with assistance first few times. No driving until no pain and off pain meds with narcotics.  Avoid intercourse 4-6 weeks, no douching or tampon use. You may walk but check with obstetrician before starting or resuming an exercise program.         Discharge Instructions/Special Treatment/Home Care Needs:     Continue prenatal vitamins.  Continue to use squirt bottle with warm water on your episiotomy for comfort after each bathroom use as desired.      Call the office for the following:     Fever over 101 degrees by mouth.  Vaginal bleeding heavier than a normal menstrual period or clots larger than a golf ball.  Red streaks or increased swelling of legs, painful red streaks on your breast.  Painful urination, constipation and increased pain or swelling or discharge with your incision.    Pain Management:     Pain Management:   Take Acetaminophen (Tylenol) or Ibuprofen (Advil, Motrin), as directed for pain. Use a warm Sitz bath 3 times daily to relieve episiotomy or hemorrhoidal discomfort. For hemorrhoidal discomfort, use Tucks and Anusol cream as needed and directed.    Follow-Up Care:     Appointment with MD:   magui  Telephone number: [phone]      Signed By: August Fernandes MD                                                                                                   Date: 11/13/2024 Time: 4:37 PM

## 2024-11-13 NOTE — PROGRESS NOTES
CNM Labor Progress Note     Patient: Jeannine Tong MRN: [id number]  SSN: [id number]    YOB: 1990  Age: 34 y.o.  Sex: female        Subjective:   Patient coping well with contractions. Pt denies needing any pain medication at this time.       Objective:   Blood pressure 135/84, pulse 75, resp. rate 16, last menstrual period 2024, SpO2 96%.    Fetal heart baseline 120, moderate  variability, accelerations present, decelerations not present, Uterine contractions q 5-7 minutes, moderate  to palpation, resting tone soft.    Sterile Vaginal Exam: Not done.   Cervical Cook Catheter in place.     Assessment:     40w4d  Category 1 fetal heart rate tracing   Early Labor   GBS: Positive   GHTN     Plan:   Continue current orders/management   IV PCN for GBS ppx  FB in place and Cytotec 25 mcg po q 2 hours.     IV pitocin to be started at 5 am  on 2024.     CNM management   Anticipate     MARY Felton CNM

## 2024-11-13 NOTE — PROGRESS NOTES
0715-Bedside shift change report given to MARYAM West RN (oncoming nurse) by SILVIO Marin RN (offgoing nurse). Report included the following information Nurse Handoff Report, Adult Overview, Intake/Output, MAR, Recent Results, and Med Rec Status.     0721-RN ordered breakfast tray for pt    0800-Dr. Fernandes called unit, this RN notified MD of BP readings, cuff adjusted, pt denies any HA, dizziness, blurred vision, RUQ pain. MD states diastolic is normal and to continue to monitor BP over the next 30-45 mins. No further orders received.    0840-Dr. Fernandes on unit, reviewed BP readings, ok with pt being transferred to MIU. No further orders received.    0945-Pt up to bathroom to void    1000-Pt doesn't feel the urge to void, pt back to bed and fundal assessment completed, no bleeding upon fundal check and bladder not distended. Pt encouraged to call for help when feeling the urge to void, pt verbalizes understanding.    1030-Bedside shift change report given to KELLIE Randall (oncoming nurse) by MARYAM West RN (offgoing nurse). Report included the following information Nurse Handoff Report, Adult Overview, Intake/Output, MAR, Recent Results, and Med Rec Status.

## 2024-11-13 NOTE — CARE COORDINATION
11/13/24 1426   Service Assessment   History Provided By Spouse   Accompanied By/Relationship Shashank Tong, spouse   Support Systems Spouse/Significant Other;Parent   Family able to assist with home care needs: Yes   Social/Functional History   Lives With Spouse;Other (comment)  (2 1/2 year old child)   Type of Home House   Discharge Planning   Type of Residence House   Living Arrangements Spouse/Significant Other   Current Services Prior To Admission None   Potential Assistance Needed N/A   Patient expects to be discharged to: House   Services At/After Discharge   Services At/After Discharge None   Confirm Follow Up Transport Family     Initial assessment was completed in person with patient's spouse, Shashank Tong.  Charted address and contact information were confirmed.  This is patient's and spouse's second child.  Pediatric care will be provided by [facility] with [facility].  Patient plans to breast feed and has a breast pump.  She also has a car seat and all the necessary equipment for her baby.  Spouse will transport at discharge.

## 2024-11-13 NOTE — PLAN OF CARE
Problem: Postpartum  Goal: Experiences normal postpartum course  Description:  Postpartum OB-Pregnancy care plan goal which identifies if the mother is experiencing a normal postpartum course  2024 07 by April West RN  Outcome: Progressing  2024 180 by Vidhya Quintana RN  Outcome: Progressing  Goal: Appropriate maternal -  bonding  Description:  Postpartum OB-Pregnancy care plan goal which identifies if the mother and  are bonding appropriately  2024 07 by April West RN  Outcome: Progressing  2024 180 by Vidhya Quintana RN  Outcome: Progressing  Goal: Establishment of infant feeding pattern  Description:  Postpartum OB-Pregnancy care plan goal which identifies if the mother is establishing a feeding pattern with their   2024 07 by April West RN  Outcome: Progressing  2024 180 by Vidhya Quintana RN  Outcome: Progressing  Goal: Incisions, wounds, or drain sites healing without S/S of infection  2024 07 by April West RN  Outcome: Progressing  2024 180 by Vidhya Quintana RN  Outcome: Progressing     Problem: Pain  Goal: Verbalizes/displays adequate comfort level or baseline comfort level  2024 07 by April West RN  Outcome: Progressing  2024 by Hilaria Marin RN  Outcome: Progressing  2024 180 by Vidhya Quintana RN  Outcome: Progressing     Problem: Infection - Adult  Goal: Absence of infection at discharge  2024 07 by April West RN  Outcome: Progressing  2024 180 by Vidhya Quintana RN  Outcome: Progressing  Goal: Absence of infection during hospitalization  2024 07 by April West RN  Outcome: Progressing  2024 180 by Vidhya Quintana RN  Outcome: Progressing  Goal: Absence of fever/infection during anticipated neutropenic period  2024 07 by April West RN  Outcome: Progressing  2024 180 by

## 2024-11-13 NOTE — PROGRESS NOTES
CNM Labor Progress Note     Patient: Jeannine Tong MRN: [id number]  SSN: [id number]    YOB: 1990  Age: 34 y.o.  Sex: female        Subjective:   Patient reports she is comfortable with her epidural.       Objective:   Blood pressure 138/82, pulse 69, temperature 98.4 °F (36.9 °C), temperature source Oral, resp. rate 16, last menstrual period 2024, SpO2 99%.    Fetal heart baseline 120, moderate variability, accelerations present, decelerations not present, Uterine contractions q 2-3  minutes,  moderate o to palpation, resting tone soft.    Sterile Vaginal Exam: 6 cm dilated/ 70 % effaced/ -2 station, cervix midline, fetal presentation vertex, membranes intact , per RN examination.       Assessment:     40w5d  Category 1 fetal heart rate tracing   Early Labor   GBS: Positive   GHTN  - not on medication.     Plan:   Continue current orders/management   IV PCN for GBS ppx.     S/p Cook catheter coming out on its own.     IV Pitocin to not be started at this time due to pt lise adequately on her own.     CNM management   Anticipate     MARY Felton CNM

## 2024-11-13 NOTE — PROGRESS NOTES
0318-H. Rajan CRNA at bedside for epidural placement.    0327-Epidural time out    0334-Test dose    0339-Bolus dose given.    0505-H. Rajan CRNA at bedside for epidural redose    0523-Dr. Garvey to bedside for fetal strip evaluation    0524-SVE and AROM performed.    0525-FSE placed    0529-New FSE placed    0607-SILVIO Bishop CNM at bedside for delivery    0610-Patient actively pushing.  RN remains in continuous attendance at the bedside.  Assessment & evaluation of fetal heart rate ongoing via continuous EFM.     0625-RN remained at bedside throughout pushing.  EFM continuously assessed.  Vaginal delivery of viable infant.

## 2024-11-14 ENCOUNTER — TELEPHONE (OUTPATIENT)
Age: 34
End: 2024-11-14

## 2024-11-14 ENCOUNTER — PATIENT MESSAGE (OUTPATIENT)
Age: 34
End: 2024-11-14

## 2024-11-14 VITALS
RESPIRATION RATE: 16 BRPM | HEART RATE: 103 BPM | DIASTOLIC BLOOD PRESSURE: 83 MMHG | OXYGEN SATURATION: 96 % | TEMPERATURE: 99 F | SYSTOLIC BLOOD PRESSURE: 143 MMHG

## 2024-11-14 LAB — T PALLIDUM AB SER QL IA: NON REACTIVE

## 2024-11-14 PROCEDURE — 94761 N-INVAS EAR/PLS OXIMETRY MLT: CPT

## 2024-11-14 PROCEDURE — 6370000000 HC RX 637 (ALT 250 FOR IP): Performed by: ADVANCED PRACTICE MIDWIFE

## 2024-11-14 RX ADMIN — DOCUSATE SODIUM 100 MG: 100 CAPSULE, LIQUID FILLED ORAL at 08:03

## 2024-11-14 NOTE — PROGRESS NOTES
Post-Partum Day Number 1 Progress Note      Patient doing well post-partum without significant complaint.  She is voiding without difficulty, she reports normal lochia. She is ambulatiing without dizziness.  Her pain is well controlled with oral pain medication. She is tolerating general diet.    Denies HA, vision changes, SOB, RUQ pain.   Vitals:  Patient Vitals for the past 8 hrs:   BP Temp Temp src Pulse Resp SpO2   24 0559 132/81 97.3 °F (36.3 °C) Oral 78 16 96 %   24 0155 105/69 97.9 °F (36.6 °C) Oral 77 18 98 %     Temp (24hrs), Av.2 °F (36.8 °C), Min:97.3 °F (36.3 °C), Max:99.5 °F (37.5 °C)        Exam:  Patient without distress.               Lungs: CTA bilaterally               CV: regular rate/rhythm, no rubs or gallops, no murmur               Abdomen soft, nontender, nondistended, normal bowel sounds               Uterus: fundus firm at level of umbilicus, nontender               Lower extremities are negative for cords or tenderness, scant swelling.    Labs: No results found for this or any previous visit (from the past 24 hour(s)).    No components found for: \"OBEXTABORH\", \"OBEXTABSCRN\", \"OBEXTRUBELLA\", \"OBEXTGRBS\", \"OBEXTHBSAG\", \"OBEXTHIV\", \"OBEXTRPR\", \"OBEXTGONORR\", \"OBEXTCHLAM\"    Assessment and Plan:   Postpartum Day #1 S/P .  Doing well.    - gHTN dx'd this adm. Pp Bps wnl. No complaints. No acute concerns. Patient provided home cuff per RN and we reviewed recommendations/precautions.   - routine care   - Requesting discharge today, appropriate                 MD [facility]

## 2024-11-14 NOTE — TELEPHONE ENCOUNTER
Patient sent another my chart message with her BP reading that this nurse just read.    Jeannine Tong \"Mk\"  P [facility] Clinical Staff (supporting August Fernandes MD)1 hour ago (2:04 PM)     ME  /90

## 2024-11-14 NOTE — LACTATION NOTE
This note was copied from a baby's chart.  Discussed with mother her plan for feeding.  Reviewed the benefits of exclusive breast milk feeding during the hospital stay.   Informed her of the risks of using formula to supplement in the first few days of life as well as the benefits of successful breast milk feeding; referred her to the Breastfeeding booklet about this information.   She acknowledges understanding of information reviewed and states that it is her plan to breastfeed her infant.  Will support her choice and offer additional information as needed. Pt will successfully establish breastfeeding by feeding in response to early feeding cues   or wake every 3h, will obtain deep latch, and will keep log of feedings/output.  Taught to BF at hunger cues and or q 2-3 hrs and to offer 10-20 drops of hand expressed colostrum at any non-feeds.                  LATCH Documentation  Latch: Repeated attempts, hold nipple in mouth, stimulate to suck  Audible Swallowing: None  Type of Nipple: Everted (after stimulation)  Comfort (Breast/Nipple): Soft/non-tender  Hold (Positioning): No assist from staff, mother able to position/hold infant  LATCH Score: 7  Reviewed breastfeeding basics:  How milk is made and normal  breastfeeding behaviors discussed.  Supply and demand,  stomach size, early feeding cues, skin to skin bonding with comfortable positioning and baby led latch-on reviewed.  How to identify signs of successful breastfeeding sessions reviewed; education on asymetrical latch, signs of effective latching vs shallow, in-effective latching, normal  feeding frequency and duration and expected infant output discussed.  Normal course of breastfeeding discussed including the AAP's recommendation that children receive exclusive breast milk feedings for the first six months of life with breast milk feedings to continue through the first year of life and/or beyond as complimentary table foods are added. 
sore side first.     Reviewed symptoms of mastitis and to notify her OB doctor.    Reviewed pumping/storage and preparation of expressed breast milk.     Mother will successfully establish breastfeeding by feeding in response to early feeding cues   or wake every 3h, will obtain deep latch, and will keep log of feedings/output.  Taught to BF at hunger cues and or q 2-3 hrs and to offer 10-20 drops of hand expressed colostrum at any non-feeds.      Left Breast: Soft  Left Nipple: Protrude  Right Nipple: Protrude  Right Breast: Soft  Position and Latch: Independently     Maternal Response: Relaxed and confident, Attentive, Comfortable             Breast Care: Lanolin provided, Other (Comment) (Hydro gel pads)     Lactation Comment: Baby last breast fed at 0730 for 15/10 minutes.    Chart shows numerous feedings, void, stool WNL.  Discussed importance of monitoring outputs and feedings on first week of life.  Discussed ways to tell if baby is  getting enough breast milk, ie  voids and stools, change in color of stool, and return to birth wt within 2 weeks.  Follow up with pediatrician visit for weight check in 1-2 days (per AAP guidelines.)  Encouraged to call Warm Line  [phone]  for any questions/problems that arise. Mother also given breastfeeding support group dates and times for any future needs

## 2024-11-14 NOTE — TELEPHONE ENCOUNTER
This nurse called the patient to discuss her my chart message    34 year old patient who delivered on 2024 .    Patient reports she was discharged home today.    Patient sent my chart message and photo of large blood clot that she passed today about 1:0015 pm.    Patient reports her bleeding is light to moderate and she is not changing a pad every 30 minutes to hour .    Patient reports some discomfort with cramping when she is feeding the baby.    Patient is not feeling weak or dizzy at this time.      This nurse encouraged patient to increase po fluids, and reviewed pain and bleeding precautions and how to get reach the on call provider.    Patient verbalized understanding.

## 2024-11-14 NOTE — TELEPHONE ENCOUNTER
Jeannine JOHNNY Ran \"Mk\"  P [facility] Clinical Staff (supporting Sharee Askew MD)57 minutes ago (2:21 PM)     ME  Photo attached   Attachments   [id number]       Jeannine TURNER Ran \"Mk\"  P [facility] Clinical Staff (supporting Sharee Askew MD)58 minutes ago (2:21 PM)     ME  I just remembered Dr Fernandes is at a conference today. I will call the office but wanted to send this so you had a picture. This big clot fell out… I would say it’s golfball size if perfectly round. We got home, I fed [place], then came up to shower and this fell out into the pad. Did not have anything like this at the hospital. Concerning?? I feel fine, no headache/blurred vision/dizziness. BP is 138/91       Jeannine Tong \"Mk\"  P [facility] Clinical Staff (supporting Sharee Askew MD)2 days ago     ME  I am still going to come in today, see you shortly.        MD Jeannine Lanza \"Mk\"2 days ago       Jeannine,     I'm Dr. Askew, one of Dr. Fernandes partners. It seems as though labor and deliver is very busy today and has to reschedule your induction. You're still welcome to come in today to be seen for a routine visit. Alternatively, they are rescheduling you for Thursday evening at 430pm. So if you're not having any issues/concerns, you could wait to come in Thursday evening at 430pm to labor and delivery on the 2nd floor.     Best,  Dr. Askew

## 2024-11-14 NOTE — TELEPHONE ENCOUNTER
This nurse spoke to work in MD,Dr. Pryor and symptoms reviewed.    Patient as called back and states no further clots at this time and no heavy vaginal bleeding. Patient was encouraged to continue with previous recommendations as concurred with MD and to call back with any changes in her symptoms.    Patient verbalized understanding.

## 2024-11-15 NOTE — DISCHARGE SUMMARY
Obstetrical Discharge Summary     Name: Jeannine Tong MRN: [id number]  SSN: [id number]    YOB: 1990  Age: 34 y.o.  Sex: female      Admit Date: 2024    Discharge Date: 2024 12:27 PM    Admitting Physician: August Fernandes MD     Attending Physician:  No att. providers found     Admission Diagnoses: Labor and delivery, indication for care [O75.9]    Procedures for this admission:     Discharge Diagnoses:   Information for the patient's :  Ran, Baby Girl Jeannine [[id number]]   @[id number]@     Discharge Condition: good    Disposition:  home      Hospital Course: Dx'd with [facility] this adm. Postpartum Bps wnl, no s/sxs of preeclampsia. Normal postpartum course following delivery.      Patient Instructions:   Discharge Medication List as of 2024  9:41 AM        CONTINUE these medications which have NOT CHANGED    Details   hydrocortisone (ANUSOL-HC) 2.5 % CREA rectal cream Use as directed per package, Disp-28 g, R-2, Normal      Prenatal w/o A Vit-Fe Fum-FA (PRENATA PO) Take by mouthHistorical Med      calcium carbonate (OSCAL) 500 MG TABS tablet Take 1 tablet by mouth dailyHistorical Med      Omega-3 Fatty Acids (FISH OIL) 300 MG CAPS Take by mouthHistorical Med      ascorbic acid (VITAMIN C) 500 MG tablet Take 1 tablet by mouth dailyHistorical Med             Reference my discharge instructions.    Follow-up Information       Follow up With Specialties Details Why Contact Info    August Fernandes MD Obstetrics & Gynecology, Gynecology, Obstetrics Follow up in 6 week(s)  [id number] [address] [address] [phone]                  Signed By:  Sharee Askew MD     November 15, 2024

## 2024-11-19 NOTE — ANESTHESIA POSTPROCEDURE EVALUATION
Department of Anesthesiology  Postprocedure Note    Patient: Jeannine Tong  MRN: [id number]  YOB: 1990  Date of evaluation: 11/19/2024    Procedure Summary       Date: 11/13/24 Room / Location:     Anesthesia Start: 0327 Anesthesia Stop: 0625    Procedure: Labor Analgesia Diagnosis:     Scheduled Providers:  Responsible Provider: Александр Messer DO    Anesthesia Type: epidural ASA Status: 2            Anesthesia Type: No value filed.    Dony Phase I:      Dony Phase II:      Anesthesia Post Evaluation    Comments: Transferred to floor by nursing per protocol.    No notable events documented.

## 2024-11-26 ENCOUNTER — TELEPHONE (OUTPATIENT)
Age: 34
End: 2024-11-26

## 2024-11-26 RX ORDER — DICLOXACILLIN SODIUM 250 MG/1
250 CAPSULE ORAL 4 TIMES DAILY
Qty: 28 CAPSULE | Refills: 0 | Status: SHIPPED | OUTPATIENT
Start: 2024-11-26 | End: 2024-12-03

## 2024-11-26 NOTE — TELEPHONE ENCOUNTER
PT call returned name and  verified    RN relayed that MD sent Rx:  Marietta Pryor MD         24  2:36 PM  Note     eRX sent for dicloxacillin 250mg q6hrs x7d.  Can schedule FU appt with Dr. Fernandes.          PT verbalizes understanding and will treat and call back for fu as needed.

## 2024-11-26 NOTE — TELEPHONE ENCOUNTER
Jeannine TURNER Ran \"Mk\"   to [facility] (supporting August Fernandes MD)   ME      11/26/24 11:24 AM  I have mastitis. I had it with [facility] as well. Could you send an Rx please?  Me   to Jeannine TURNER Ran \"Mk\"         11/26/24 12:58 PM  Good afternoon,      Dr Fernandes is out of the office this afternoon. I will have one of the triage nurses reach out to you by phone to discuss symptoms.      Blaise,   Bridger Alfonso LPN     Please note- My Chart is for non-urgent health concerns. You can expect a reply from our office within 2 business days. You should not email your provider about emergent health issues. If you have an emergency, please call 911. If you have an urgent concern, please call our office at [phone].

## 2024-11-26 NOTE — TELEPHONE ENCOUNTER
PT contacted by RN, name and  verified    35 yo last ov 24,  24    PT had sent a MC message today and was contacted by RN.  PT  message:  Jeannine Tong \"Mk\"   to [facility] (supporting August Fernandes MD)   ME      24 11:24 AM  I have mastitis. I had it with [facility] as well. Could you send an Rx please?      PT has states \"I have mastitis\" and was seeing if I could be treated for it.  RN inquired and PT started with R side redness, pain and fever 101. PT has been using warm compresses, breastfeeding and pumping on that side.   PT has a history of this and breast fed her first, so she states she is aware of the sx.  RN relayed  was out of the office and the message would be sent to the work in Dr.Mrava COREY to review.    Preferred pharmacy verified  HW patient  Please review and advise  Thank you

## 2025-01-08 NOTE — PROGRESS NOTES
Jeannine Tong is a 34 y.o. female returns for a routine post-partum follow-up visit     No chief complaint on file.      Postpartum Depression: Low Risk  (2024)    [place]  Depression Scale     Last EPDS Total Score: 3     Last EPDS Self Harm Result: Never         Type of delivery: normal spontaneous vaginal delivery  Date of Delivery: 2024  Breastfeeding: yes  Bleeding Resolved: yes  Birth Control: none.  Last Pap: normal obtained 1 year(s) ago.        Problems: no problems    1. Have you been to the ER, urgent care clinic, or hospitalized since your last visit? Yes 2024 Childbirth [facility]    2. Have you seen or consulted any other health care providers outside of the [facility] since your last visit? No    Examination chaperoned by Irlanda Billy LPN.

## 2025-01-16 ENCOUNTER — POSTPARTUM VISIT (OUTPATIENT)
Age: 35
End: 2025-01-16

## 2025-01-16 VITALS
RESPIRATION RATE: 16 BRPM | BODY MASS INDEX: 30.19 KG/M2 | HEIGHT: 64 IN | WEIGHT: 176.8 LBS | OXYGEN SATURATION: 99 % | SYSTOLIC BLOOD PRESSURE: 106 MMHG | HEART RATE: 83 BPM | TEMPERATURE: 97.9 F | DIASTOLIC BLOOD PRESSURE: 73 MMHG

## 2025-01-16 PROCEDURE — 0503F POSTPARTUM CARE VISIT: CPT | Performed by: OBSTETRICS & GYNECOLOGY

## 2025-01-16 NOTE — PROGRESS NOTES
Jeannine Tong is a 35 y.o. female returns for a routine post-partum follow-up visit     Chief Complaint   Patient presents with    Postpartum Care       Postpartum Depression: Low Risk  (2024)    [place]  Depression Scale     Last EPDS Total Score: 3     Last EPDS Self Harm Result: Never         Type of delivery: normal spontaneous vaginal delivery  Date of Delivery: 2024  Breastfeeding: yes  Bleeding Resolved: yes  Birth Control: none.  Last Pap: normal obtained 1 year(s) ago.    Postpartum evaluation    Jeannine Tong is a 35 y.o. female who presents for a postpartum exam.     She is now six weeks post normal spontaneous vaginal delivery.    Her baby is doing well.    She has had no menses since delivery.     She has had the following significant problems since her delivery: none    The patient is breast feeding without difficulty.     The patient would like to use nothing for birth control.     She is currently taking: no medications.     She is due for her next AE in 12 months.     /73   Pulse 83   Temp 97.9 °F (36.6 °C)   Resp 16   Ht 1.626 m (5' 4\")   Wt 80.2 kg (176 lb 12.8 oz)   SpO2 99%   Breastfeeding Yes   BMI 30.35 kg/m²     PHYSICAL EXAMINATION    Constitutional  Appearance: well-nourished, well developed, alert, in no acute distress    HENT  Head and Face: appears normal    Gastrointestinal  Abdominal Examination: abdomen non-tender to palpation, normal bowel sounds, no masses present  Liver and spleen: no hepatomegaly present, spleen not palpable  Hernias: no hernias identified    Genitourinary  External Genitalia: normal appearance for age, no discharge present, no tenderness present, no inflammatory lesions present, no masses present, no atrophy present  Vagina: normal vaginal vault without central or paravaginal defects, no discharge present, no inflammatory lesions present, no masses present  Bladder: non-tender to palpation  Urethra: appears

## (undated) DEVICE — SET EXTN TBNG L BOR 4 W STPCOCK ST 32IN PRIMING VOL 6ML

## (undated) DEVICE — BAG SPEC BIOHZD LF 2MIL 6X10IN -- CONVERT TO ITEM 357326

## (undated) DEVICE — CONTAINER SPEC 20 ML LID NEUT BUFF FORMALIN 10 % POLYPR STS

## (undated) DEVICE — SOLIDIFIER FLUID 3000 CC ABSORB

## (undated) DEVICE — KIT IV STRT W CHLORAPREP PD 1ML

## (undated) DEVICE — BW-412T DISP COMBO CLEANING BRUSH: Brand: SINGLE USE COMBINATION CLEANING BRUSH

## (undated) DEVICE — BAG BELONG PT PERS CLEAR HANDL

## (undated) DEVICE — SET ADMIN 16ML TBNG L100IN 2 Y INJ SITE IV PIGGY BK DISP

## (undated) DEVICE — NEEDLE HYPO 18GA L1.5IN PNK S STL HUB POLYPR SHLD REG BVL

## (undated) DEVICE — CATH IV AUTOGRD BC BLU 22GA 25 -- INSYTE

## (undated) DEVICE — CANN NASAL O2 CAPNOGRAPHY AD -- FILTERLINE

## (undated) DEVICE — Device

## (undated) DEVICE — ENDO CARRY-ON PROCEDURE KIT INCLUDES ENZYMATIC SPONGE, GAUZE, BIOHAZARD LABEL, TRAY, LUBRICANT, DIRTY SCOPE LABEL, WATER LABEL, TRAY, DRAWSTRING PAD, AND DEFENDO 4-PIECE KIT.: Brand: ENDO CARRY-ON PROCEDURE KIT

## (undated) DEVICE — KENDALL RADIOLUCENT FOAM MONITORING ELECTRODE -RECTANGULAR SHAPE: Brand: KENDALL

## (undated) DEVICE — SYRINGE MED 20ML STD CLR PLAS LUERLOCK TIP N CTRL DISP

## (undated) DEVICE — 1200 GUARD II KIT W/5MM TUBE W/O VAC TUBE: Brand: GUARDIAN

## (undated) DEVICE — FORCEPS BX L240CM JAW DIA2.8MM L CAP W/ NDL MIC MESH TOOTH